# Patient Record
Sex: MALE | Race: WHITE
[De-identification: names, ages, dates, MRNs, and addresses within clinical notes are randomized per-mention and may not be internally consistent; named-entity substitution may affect disease eponyms.]

---

## 2021-02-18 ENCOUNTER — HOSPITAL ENCOUNTER (OUTPATIENT)
Dept: HOSPITAL 19 - COL.RAD | Age: 71
End: 2021-02-18
Attending: FAMILY MEDICINE
Payer: MEDICARE

## 2021-02-18 DIAGNOSIS — K82.9: Primary | ICD-10-CM

## 2021-02-18 DIAGNOSIS — K76.89: ICD-10-CM

## 2021-02-18 DIAGNOSIS — R18.8: ICD-10-CM

## 2021-02-22 ENCOUNTER — HOSPITAL ENCOUNTER (INPATIENT)
Dept: HOSPITAL 19 - COL.ER | Age: 71
LOS: 3 days | Discharge: HOME | DRG: 394 | End: 2021-02-25
Attending: INTERNAL MEDICINE | Admitting: STUDENT IN AN ORGANIZED HEALTH CARE EDUCATION/TRAINING PROGRAM
Payer: MEDICARE

## 2021-02-22 VITALS — OXYGEN SATURATION: 99 %

## 2021-02-22 VITALS — OXYGEN SATURATION: 98 %

## 2021-02-22 VITALS — OXYGEN SATURATION: 100 %

## 2021-02-22 VITALS — OXYGEN SATURATION: 97 %

## 2021-02-22 VITALS
DIASTOLIC BLOOD PRESSURE: 90 MMHG | HEART RATE: 74 BPM | TEMPERATURE: 97.8 F | OXYGEN SATURATION: 99 % | SYSTOLIC BLOOD PRESSURE: 133 MMHG

## 2021-02-22 VITALS — OXYGEN SATURATION: 96 %

## 2021-02-22 VITALS — HEIGHT: 67.99 IN | WEIGHT: 239.64 LBS | BODY MASS INDEX: 36.32 KG/M2

## 2021-02-22 DIAGNOSIS — K75.81: ICD-10-CM

## 2021-02-22 DIAGNOSIS — K21.9: ICD-10-CM

## 2021-02-22 DIAGNOSIS — E11.9: ICD-10-CM

## 2021-02-22 DIAGNOSIS — D53.9: ICD-10-CM

## 2021-02-22 DIAGNOSIS — E11.40: ICD-10-CM

## 2021-02-22 DIAGNOSIS — E87.5: ICD-10-CM

## 2021-02-22 DIAGNOSIS — D69.6: ICD-10-CM

## 2021-02-22 DIAGNOSIS — K29.30: ICD-10-CM

## 2021-02-22 DIAGNOSIS — K64.8: Primary | ICD-10-CM

## 2021-02-22 DIAGNOSIS — I10: ICD-10-CM

## 2021-02-22 DIAGNOSIS — K74.60: ICD-10-CM

## 2021-02-22 DIAGNOSIS — K72.90: ICD-10-CM

## 2021-02-22 DIAGNOSIS — R18.8: ICD-10-CM

## 2021-02-22 LAB
ALBUMIN SERPL-MCNC: 2.8 GM/DL (ref 3.5–5)
ALP SERPL-CCNC: 241 U/L (ref 50–136)
ALT SERPL-CCNC: 40 U/L (ref 4–49)
ANION GAP SERPL CALC-SCNC: 5 MMOL/L (ref 7–16)
ANION GAP SERPL CALC-SCNC: 7 MMOL/L (ref 7–16)
APPEARANCE PRT: CLEAR
AST SERPL-CCNC: 81 U/L (ref 15–37)
BASOPHILS # BLD: 0 10*3/UL (ref 0–0.2)
BASOPHILS NFR BLD AUTO: 0.5 % (ref 0–2)
BILIRUB SERPL-MCNC: 3.3 MG/DL (ref 0–1)
BUN SERPL-MCNC: 28 MG/DL (ref 9–20)
BUN SERPL-MCNC: 29 MG/DL (ref 9–20)
CALCIUM SERPL-MCNC: 8.2 MG/DL (ref 8.4–10.2)
CALCIUM SERPL-MCNC: 8.4 MG/DL (ref 8.4–10.2)
CHLORIDE SERPL-SCNC: 108 MMOL/L (ref 98–107)
CHLORIDE SERPL-SCNC: 110 MMOL/L (ref 98–107)
CO2 SERPL-SCNC: 21 MMOL/L (ref 22–30)
CO2 SERPL-SCNC: 23 MMOL/L (ref 22–30)
CREAT SERPL-SCNC: 0.83 UMOL/L (ref 0.66–1.25)
CREAT SERPL-SCNC: 0.9 UMOL/L (ref 0.66–1.25)
EOSINOPHIL # BLD: 0.2 10*3/UL (ref 0–0.7)
EOSINOPHIL NFR BLD: 4.2 % (ref 0–4)
ERYTHROCYTE [DISTWIDTH] IN BLOOD BY AUTOMATED COUNT: 16.1 % (ref 11.5–14.5)
GLUCOSE SERPL-MCNC: 126 MG/DL (ref 74–106)
GLUCOSE SERPL-MCNC: 144 MG/DL (ref 74–106)
GRANULOCYTES # BLD AUTO: 60.9 % (ref 42.2–75.2)
GRANULOCYTES # PRT: 25.6 % (ref 0–25)
HCT VFR BLD AUTO: 24 % (ref 42–52)
HCT VFR BLD AUTO: 26.5 % (ref 42–52)
HGB BLD-MCNC: 7.9 G/DL (ref 13.5–18)
HGB BLD-MCNC: 8.8 G/DL (ref 13.5–18)
INR BLD: 1.8 (ref 0.8–3)
IRON SERPL-MCNC: 120 UG/DL (ref 35–150)
LIPASE SERPL-CCNC: 194 U/L (ref 23–300)
LYMPHOCYTES # BLD: 1.3 10*3/UL (ref 1.2–3.4)
LYMPHOCYTES NFR BLD: 24.3 % (ref 20–51)
MCH RBC QN AUTO: 36 PG (ref 27–31)
MCHC RBC AUTO-ENTMCNC: 33 G/DL (ref 33–37)
MCV RBC AUTO: 107 FL (ref 80–100)
MONOCYTES # BLD: 0.5 10*3/UL (ref 0.1–0.6)
MONOCYTES NFR BLD AUTO: 9.7 % (ref 1.7–9.3)
MONONUC CELLS # PRT: 74.4 % (ref 0–75)
NEUTROPHILS # BLD: 3.3 10*3/UL (ref 1.4–6.5)
PERITONEAL FLUID RBC: 0 /MM3 (ref 0–0)
PH UR STRIP.AUTO: 5 [PH] (ref 5–8)
PLATELET # BLD AUTO: 127 K/MM3 (ref 130–400)
PMV BLD AUTO: 11.4 FL (ref 7.4–10.4)
POTASSIUM SERPL-SCNC: 5.4 MMOL/L (ref 3.4–5)
POTASSIUM SERPL-SCNC: 5.6 MMOL/L (ref 3.4–5)
PROT SERPL-MCNC: 7.2 GM/DL (ref 6.4–8.2)
PROTHROMBIN TIME: 20.1 SECONDS (ref 9.7–12.8)
RBC # BLD AUTO: 2.47 M/MM3 (ref 4.2–5.6)
RBC # UR: (no result) /HPF
SODIUM SERPL-SCNC: 136 MMOL/L (ref 137–145)
SODIUM SERPL-SCNC: 137 MMOL/L (ref 137–145)
SP GR UR STRIP.AUTO: 1.02 (ref 1–1.03)
TIBC SERPL-MCNC: 178 UG/DL (ref 261–462)
URN COLLECT METHOD CLASS: (no result)
UROBILINOGEN UR STRIP.AUTO-MCNC: 2 MG/DL
WBC # PRT AUTO: 253 /MM3 (ref 0–1000)

## 2021-02-22 PROCEDURE — 02HV33Z INSERTION OF INFUSION DEVICE INTO SUPERIOR VENA CAVA, PERCUTANEOUS APPROACH: ICD-10-PCS | Performed by: INTERNAL MEDICINE

## 2021-02-22 PROCEDURE — C1892 INTRO/SHEATH,FIXED,PEEL-AWAY: HCPCS

## 2021-02-22 PROCEDURE — P9016 RBC LEUKOCYTES REDUCED: HCPCS

## 2021-02-22 PROCEDURE — C9113 INJ PANTOPRAZOLE SODIUM, VIA: HCPCS

## 2021-02-22 PROCEDURE — C1751 CATH, INF, PER/CENT/MIDLINE: HCPCS

## 2021-02-22 NOTE — NUR
Pt is noted to have a mild, intermittent stridorous sound with exhalation.
Resp sats WNL, no signs of distress, lungs CTA. RT, Ana, was walking by,
requested second opinion. If persists or worsens will contact provider.

## 2021-02-22 NOTE — NUR
Pt noted during assessment a scant amount of bleeding noted to right upper
teeth. Gums assessed with abrasion noted. Once pt cleared mouth, no further
bleeding noted at this time.

## 2021-02-22 NOTE — NUR
Pts spouse called for an update on pt. Answered questions which included: pt
has received evening dose of Lactulose although has not had a bowel movement.
Spouse reports pt had not had one since last night. Vitals are stable. Pt has
no complaints. Is alert and oriented, answering questions to what this nurse
feels is appropriate. Discussion about [place], moving here, kids, town,
, year. Pt had jello and has been drinking water. Is on clear liquid diet.
Pt reports understanding of procedure scheduled for tomorrow, even making hand
jestures consistent with what procedure will be performed. Denies any
questions about procedure at this time. Spouse reports that the only
discussion that was had with her was in the ED. Has not spoke with a physician
since pt was admitted into this unit. Pt is currently resting with eyes closed
in bed. Pt room number provided to spouse. 4 digit passcode was verified prior
to the previous conversation. Encouraged to call with any further questions or
request for updates. Will call with any change in pt condition through out
this shift.

## 2021-02-23 VITALS — OXYGEN SATURATION: 97 %

## 2021-02-23 VITALS — OXYGEN SATURATION: 100 %

## 2021-02-23 VITALS — OXYGEN SATURATION: 99 %

## 2021-02-23 VITALS — OXYGEN SATURATION: 98 %

## 2021-02-23 VITALS
HEART RATE: 70 BPM | SYSTOLIC BLOOD PRESSURE: 115 MMHG | TEMPERATURE: 97.7 F | OXYGEN SATURATION: 99 % | DIASTOLIC BLOOD PRESSURE: 73 MMHG

## 2021-02-23 VITALS — OXYGEN SATURATION: 96 %

## 2021-02-23 VITALS
HEART RATE: 68 BPM | DIASTOLIC BLOOD PRESSURE: 83 MMHG | SYSTOLIC BLOOD PRESSURE: 128 MMHG | TEMPERATURE: 98.7 F | OXYGEN SATURATION: 99 %

## 2021-02-23 VITALS
DIASTOLIC BLOOD PRESSURE: 51 MMHG | TEMPERATURE: 97.8 F | SYSTOLIC BLOOD PRESSURE: 110 MMHG | HEART RATE: 79 BPM | OXYGEN SATURATION: 99 %

## 2021-02-23 VITALS — SYSTOLIC BLOOD PRESSURE: 140 MMHG | TEMPERATURE: 98.4 F | DIASTOLIC BLOOD PRESSURE: 83 MMHG | HEART RATE: 73 BPM

## 2021-02-23 VITALS — OXYGEN SATURATION: 95 %

## 2021-02-23 VITALS — DIASTOLIC BLOOD PRESSURE: 53 MMHG | HEART RATE: 78 BPM | SYSTOLIC BLOOD PRESSURE: 94 MMHG | OXYGEN SATURATION: 100 %

## 2021-02-23 VITALS — SYSTOLIC BLOOD PRESSURE: 135 MMHG | DIASTOLIC BLOOD PRESSURE: 88 MMHG | HEART RATE: 76 BPM | TEMPERATURE: 97.3 F

## 2021-02-23 LAB
ALBUMIN SERPL-MCNC: 2.5 GM/DL (ref 3.5–5)
ALP SERPL-CCNC: 162 U/L (ref 50–136)
ALT SERPL-CCNC: 37 U/L (ref 4–49)
ANION GAP SERPL CALC-SCNC: 5 MMOL/L (ref 7–16)
AST SERPL-CCNC: 75 U/L (ref 15–37)
BILIRUB SERPL-MCNC: 3.4 MG/DL (ref 0–1)
BUN SERPL-MCNC: 27 MG/DL (ref 9–20)
CALCIUM SERPL-MCNC: 8 MG/DL (ref 8.4–10.2)
CHLORIDE SERPL-SCNC: 108 MMOL/L (ref 98–107)
CO2 SERPL-SCNC: 23 MMOL/L (ref 22–30)
CREAT SERPL-SCNC: 0.83 UMOL/L (ref 0.66–1.25)
ERYTHROCYTE [DISTWIDTH] IN BLOOD BY AUTOMATED COUNT: 16 % (ref 11.5–14.5)
FOLATE (FOLIC ACID): 14.8 NG/ML (ref 7–31.4)
GLUCOSE SERPL-MCNC: 99 MG/DL (ref 74–106)
HCT VFR BLD AUTO: 22.9 % (ref 42–52)
HGB BLD-MCNC: 7.6 G/DL (ref 13.5–18)
MCH RBC QN AUTO: 35 PG (ref 27–31)
MCHC RBC AUTO-ENTMCNC: 33 G/DL (ref 33–37)
MCV RBC AUTO: 106 FL (ref 80–100)
PLATELET # BLD AUTO: 114 K/MM3 (ref 130–400)
PMV BLD AUTO: 10.6 FL (ref 7.4–10.4)
POTASSIUM SERPL-SCNC: 5.2 MMOL/L (ref 3.4–5)
PROT SERPL-MCNC: 6.8 GM/DL (ref 6.4–8.2)
RBC # BLD AUTO: 2.16 M/MM3 (ref 4.2–5.6)
SODIUM SERPL-SCNC: 136 MMOL/L (ref 137–145)

## 2021-02-23 PROCEDURE — 0W9G3ZZ DRAINAGE OF PERITONEAL CAVITY, PERCUTANEOUS APPROACH: ICD-10-PCS | Performed by: INTERNAL MEDICINE

## 2021-02-23 PROCEDURE — 0DB68ZX EXCISION OF STOMACH, VIA NATURAL OR ARTIFICIAL OPENING ENDOSCOPIC, DIAGNOSTIC: ICD-10-PCS | Performed by: INTERNAL MEDICINE

## 2021-02-23 NOTE — NUR
Pt has been pleasant and cooperative with each encounter through out shift. Is
currently resting in bed. Requested some wet mouth swabs for dry mouth. Denies
any complaints. Is using the urinal at bedside as needed. No further bleeding
noted to gums. A&O to self, time, situation and current events.

## 2021-02-23 NOTE — NUR
PT here at 1420 from [facility] unit. PT is ambulatory with a steady gait. PT
moved to bed and has zero complaints. Vitals WNL limits. No bleeding, oozing,
or bruising of catheter insertion site.

## 2021-02-24 NOTE — NUR
Follow-up visit; Patient appeared to feel better today and stated he was going
to start going to [employer] and pray.  offered God's blessings and wished
him well.

## 2021-02-25 VITALS — HEART RATE: 70 BPM | DIASTOLIC BLOOD PRESSURE: 79 MMHG | SYSTOLIC BLOOD PRESSURE: 107 MMHG | TEMPERATURE: 97.8 F

## 2021-02-25 VITALS — SYSTOLIC BLOOD PRESSURE: 105 MMHG | HEART RATE: 72 BPM | DIASTOLIC BLOOD PRESSURE: 68 MMHG | TEMPERATURE: 97.7 F

## 2021-02-25 VITALS — HEART RATE: 78 BPM | TEMPERATURE: 97.8 F | SYSTOLIC BLOOD PRESSURE: 112 MMHG | DIASTOLIC BLOOD PRESSURE: 56 MMHG

## 2021-02-25 LAB
ALBUMIN SERPL-MCNC: 2.2 GM/DL (ref 3.5–5)
ALP SERPL-CCNC: 157 U/L (ref 50–136)
ALT SERPL-CCNC: 33 U/L (ref 4–49)
ANION GAP SERPL CALC-SCNC: 3 MMOL/L (ref 7–16)
ANION GAP SERPL CALC-SCNC: 3 MMOL/L (ref 7–16)
AST SERPL-CCNC: 71 U/L (ref 15–37)
BASOPHILS # BLD: 0 10*3/UL (ref 0–0.2)
BASOPHILS NFR BLD AUTO: 0.5 % (ref 0–2)
BILIRUB SERPL-MCNC: 2.5 MG/DL (ref 0–1)
BUN SERPL-MCNC: 25 MG/DL (ref 9–20)
BUN SERPL-MCNC: 27 MG/DL (ref 9–20)
CALCIUM SERPL-MCNC: 8 MG/DL (ref 8.4–10.2)
CALCIUM SERPL-MCNC: 8.1 MG/DL (ref 8.4–10.2)
CHLORIDE SERPL-SCNC: 105 MMOL/L (ref 98–107)
CHLORIDE SERPL-SCNC: 106 MMOL/L (ref 98–107)
CO2 SERPL-SCNC: 25 MMOL/L (ref 22–30)
CO2 SERPL-SCNC: 27 MMOL/L (ref 22–30)
CREAT SERPL-SCNC: 0.97 UMOL/L (ref 0.66–1.25)
CREAT SERPL-SCNC: 1.06 UMOL/L (ref 0.66–1.25)
EOSINOPHIL # BLD: 0.2 10*3/UL (ref 0–0.7)
EOSINOPHIL NFR BLD: 3.9 % (ref 0–4)
ERYTHROCYTE [DISTWIDTH] IN BLOOD BY AUTOMATED COUNT: 15.8 % (ref 11.5–14.5)
ERYTHROCYTE [DISTWIDTH] IN BLOOD BY AUTOMATED COUNT: 16.5 % (ref 11.5–14.5)
GLUCOSE SERPL-MCNC: 114 MG/DL (ref 74–106)
GLUCOSE SERPL-MCNC: 98 MG/DL (ref 74–106)
GRANULOCYTES # BLD AUTO: 51.5 % (ref 42.2–75.2)
HCT VFR BLD AUTO: 20.3 % (ref 42–52)
HCT VFR BLD AUTO: 22 % (ref 42–52)
HCT VFR BLD AUTO: 23.8 % (ref 42–52)
HGB BLD-MCNC: 7 G/DL (ref 13.5–18)
HGB BLD-MCNC: 7.4 G/DL (ref 13.5–18)
HGB BLD-MCNC: 7.8 G/DL (ref 13.5–18)
LYMPHOCYTES # BLD: 1.3 10*3/UL (ref 1.2–3.4)
LYMPHOCYTES NFR BLD: 30.5 % (ref 20–51)
MCH RBC QN AUTO: 35 PG (ref 27–31)
MCH RBC QN AUTO: 36 PG (ref 27–31)
MCHC RBC AUTO-ENTMCNC: 34 G/DL (ref 33–37)
MCHC RBC AUTO-ENTMCNC: 35 G/DL (ref 33–37)
MCV RBC AUTO: 103 FL (ref 80–100)
MCV RBC AUTO: 104 FL (ref 80–100)
MONOCYTES # BLD: 0.6 10*3/UL (ref 0.1–0.6)
MONOCYTES NFR BLD AUTO: 13.4 % (ref 1.7–9.3)
NEUTROPHILS # BLD: 2.1 10*3/UL (ref 1.4–6.5)
PLATELET # BLD AUTO: 75 K/MM3 (ref 130–400)
PLATELET # BLD AUTO: 88 K/MM3 (ref 130–400)
PMV BLD AUTO: 11.2 FL (ref 7.4–10.4)
PMV BLD AUTO: 11.3 FL (ref 7.4–10.4)
POTASSIUM SERPL-SCNC: 4.5 MMOL/L (ref 3.4–5)
POTASSIUM SERPL-SCNC: 4.9 MMOL/L (ref 3.4–5)
PROT SERPL-MCNC: 6.1 GM/DL (ref 6.4–8.2)
RBC # BLD AUTO: 1.96 M/MM3 (ref 4.2–5.6)
RBC # BLD AUTO: 2.14 M/MM3 (ref 4.2–5.6)
SODIUM SERPL-SCNC: 134 MMOL/L (ref 137–145)
SODIUM SERPL-SCNC: 135 MMOL/L (ref 137–145)

## 2021-02-25 NOTE — NUR
Discharge instructions given to the patient and his wife, instructed to take
meds as prescribed, follow up as scheduled with PCP and GI, meds as
prescribed, scripts sent to pharmacy for him, PICC line removed and post
instructions given, leaving with his wife and CNA escorting them out

## 2021-02-25 NOTE — NUR
Follow-up visit; Patient and his wife thanked  for coming in and
meeting his wife and offering God's blessings to them.

## 2021-02-25 NOTE — NUR
met with patient to discuss discharge planning. Patient lives in
[place] with his wife, Sherlyn (ph#[phone]) and his son, Andrew. Patient
states he does not have a primary care physician but is working on it. SW
offered to make him an appointment but patient declined. Patient cannot
remember what pharmacy he uses but states his wife would know. Patient does
not use any DME and reports independence with ADLS. Patient plans to return
home upon discharge. SW will continue to follow.

## 2021-02-25 NOTE — NUR
Primary nurse was assisted with [phone] patient care by [facility] [employer] [facility]
Skylar Burnett and [employer] instructor Evangelina Germain RN-BC.

## 2021-02-25 NOTE — NUR
attended clinical rounds with the team. The patient's wife
was in the room. The patient is independent in the room. PT signed off. The
patient's PCP is Dr. Le. Plan is home.

## 2021-03-28 ENCOUNTER — HOSPITAL ENCOUNTER (INPATIENT)
Dept: HOSPITAL 19 - COL.ER | Age: 71
LOS: 3 days | Discharge: HOME | DRG: 441 | End: 2021-03-31
Attending: HOSPITALIST | Admitting: HOSPITALIST
Payer: MEDICARE

## 2021-03-28 VITALS — HEIGHT: 70 IN | WEIGHT: 241.85 LBS | BODY MASS INDEX: 34.62 KG/M2

## 2021-03-28 DIAGNOSIS — K27.9: ICD-10-CM

## 2021-03-28 DIAGNOSIS — K31.89: ICD-10-CM

## 2021-03-28 DIAGNOSIS — K75.81: ICD-10-CM

## 2021-03-28 DIAGNOSIS — K31.811: ICD-10-CM

## 2021-03-28 DIAGNOSIS — K72.90: ICD-10-CM

## 2021-03-28 DIAGNOSIS — D53.9: ICD-10-CM

## 2021-03-28 DIAGNOSIS — E11.9: ICD-10-CM

## 2021-03-28 DIAGNOSIS — K74.60: ICD-10-CM

## 2021-03-28 DIAGNOSIS — E87.2: ICD-10-CM

## 2021-03-28 DIAGNOSIS — K76.6: Primary | ICD-10-CM

## 2021-03-28 DIAGNOSIS — I10: ICD-10-CM

## 2021-03-28 DIAGNOSIS — K29.70: ICD-10-CM

## 2021-03-28 DIAGNOSIS — K44.9: ICD-10-CM

## 2021-03-28 DIAGNOSIS — D68.4: ICD-10-CM

## 2021-03-28 DIAGNOSIS — D69.6: ICD-10-CM

## 2021-03-28 DIAGNOSIS — D62: ICD-10-CM

## 2021-03-28 DIAGNOSIS — R18.8: ICD-10-CM

## 2021-03-28 DIAGNOSIS — Z87.891: ICD-10-CM

## 2021-03-28 DIAGNOSIS — N17.9: ICD-10-CM

## 2021-03-28 DIAGNOSIS — Z20.828: ICD-10-CM

## 2021-03-28 DIAGNOSIS — K21.9: ICD-10-CM

## 2021-03-28 DIAGNOSIS — Z88.7: ICD-10-CM

## 2021-03-28 LAB
ALBUMIN SERPL-MCNC: 2.3 GM/DL (ref 3.5–5)
ALP SERPL-CCNC: 189 U/L (ref 50–136)
ALT SERPL-CCNC: 34 U/L (ref 4–49)
ANION GAP SERPL CALC-SCNC: 9 MMOL/L (ref 7–16)
APTT PPP: 39.2 SECONDS (ref 26–37)
AST SERPL-CCNC: 91 U/L (ref 15–37)
BASOPHILS # BLD: 0 10*3/UL (ref 0–0.2)
BASOPHILS NFR BLD AUTO: 0.2 % (ref 0–2)
BILIRUB SERPL-MCNC: 2.9 MG/DL (ref 0–1)
BUN SERPL-MCNC: 28 MG/DL (ref 9–20)
CALCIUM SERPL-MCNC: 7.8 MG/DL (ref 8.4–10.2)
CHLORIDE SERPL-SCNC: 106 MMOL/L (ref 98–107)
CO2 SERPL-SCNC: 19 MMOL/L (ref 22–30)
CREAT SERPL-SCNC: 1.53 UMOL/L (ref 0.66–1.25)
EOSINOPHIL # BLD: 0.1 10*3/UL (ref 0–0.7)
EOSINOPHIL NFR BLD: 1.6 % (ref 0–4)
ERYTHROCYTE [DISTWIDTH] IN BLOOD BY AUTOMATED COUNT: 16.3 % (ref 11.5–14.5)
GLUCOSE SERPL-MCNC: 147 MG/DL (ref 74–106)
GRANULOCYTES # BLD AUTO: 83.1 % (ref 42.2–75.2)
HCT VFR BLD AUTO: 22.2 % (ref 42–52)
HGB BLD-MCNC: 7.2 G/DL (ref 13.5–18)
INR BLD: 2.1 (ref 0.8–3)
LACTATE SERPL-SCNC: 4.3 MMOL/L (ref 0.4–2)
LIPASE SERPL-CCNC: 206 U/L (ref 23–300)
LYMPHOCYTES # BLD: 0.6 10*3/UL (ref 1.2–3.4)
LYMPHOCYTES NFR BLD: 7.1 % (ref 20–51)
MCH RBC QN AUTO: 35 PG (ref 27–31)
MCHC RBC AUTO-ENTMCNC: 32 G/DL (ref 33–37)
MCV RBC AUTO: 107 FL (ref 80–100)
MONOCYTES # BLD: 0.6 10*3/UL (ref 0.1–0.6)
MONOCYTES NFR BLD AUTO: 7.5 % (ref 1.7–9.3)
NEUTROPHILS # BLD: 6.7 10*3/UL (ref 1.4–6.5)
PLATELET # BLD AUTO: 83 K/MM3 (ref 130–400)
PMV BLD AUTO: 11.8 FL (ref 7.4–10.4)
POTASSIUM SERPL-SCNC: 4.6 MMOL/L (ref 3.4–5)
PROT SERPL-MCNC: 6.7 GM/DL (ref 6.4–8.2)
PROTHROMBIN TIME: 23.5 SECONDS (ref 9.7–12.8)
RBC # BLD AUTO: 2.08 M/MM3 (ref 4.2–5.6)
SODIUM SERPL-SCNC: 134 MMOL/L (ref 137–145)

## 2021-03-28 PROCEDURE — P9016 RBC LEUKOCYTES REDUCED: HCPCS

## 2021-03-28 PROCEDURE — C9113 INJ PANTOPRAZOLE SODIUM, VIA: HCPCS

## 2021-03-29 VITALS — OXYGEN SATURATION: 98 %

## 2021-03-29 VITALS — OXYGEN SATURATION: 95 %

## 2021-03-29 VITALS — OXYGEN SATURATION: 96 %

## 2021-03-29 VITALS
DIASTOLIC BLOOD PRESSURE: 85 MMHG | TEMPERATURE: 98.1 F | OXYGEN SATURATION: 96 % | HEART RATE: 79 BPM | SYSTOLIC BLOOD PRESSURE: 125 MMHG

## 2021-03-29 VITALS — OXYGEN SATURATION: 97 %

## 2021-03-29 VITALS — SYSTOLIC BLOOD PRESSURE: 116 MMHG | DIASTOLIC BLOOD PRESSURE: 101 MMHG | HEART RATE: 80 BPM | TEMPERATURE: 98.1 F

## 2021-03-29 VITALS — HEART RATE: 68 BPM | DIASTOLIC BLOOD PRESSURE: 74 MMHG | SYSTOLIC BLOOD PRESSURE: 105 MMHG | TEMPERATURE: 98.2 F

## 2021-03-29 VITALS
DIASTOLIC BLOOD PRESSURE: 80 MMHG | TEMPERATURE: 98 F | HEART RATE: 74 BPM | OXYGEN SATURATION: 97 % | SYSTOLIC BLOOD PRESSURE: 104 MMHG

## 2021-03-29 VITALS — TEMPERATURE: 98.1 F | HEART RATE: 77 BPM | SYSTOLIC BLOOD PRESSURE: 109 MMHG | DIASTOLIC BLOOD PRESSURE: 75 MMHG

## 2021-03-29 VITALS — OXYGEN SATURATION: 99 %

## 2021-03-29 VITALS — TEMPERATURE: 98.1 F | DIASTOLIC BLOOD PRESSURE: 75 MMHG | HEART RATE: 67 BPM | SYSTOLIC BLOOD PRESSURE: 118 MMHG

## 2021-03-29 VITALS
TEMPERATURE: 98.1 F | SYSTOLIC BLOOD PRESSURE: 111 MMHG | HEART RATE: 80 BPM | OXYGEN SATURATION: 98 % | DIASTOLIC BLOOD PRESSURE: 74 MMHG

## 2021-03-29 VITALS — DIASTOLIC BLOOD PRESSURE: 89 MMHG | HEART RATE: 80 BPM | TEMPERATURE: 98.1 F | SYSTOLIC BLOOD PRESSURE: 102 MMHG

## 2021-03-29 VITALS — OXYGEN SATURATION: 94 %

## 2021-03-29 VITALS
DIASTOLIC BLOOD PRESSURE: 80 MMHG | SYSTOLIC BLOOD PRESSURE: 110 MMHG | HEART RATE: 80 BPM | OXYGEN SATURATION: 98 % | TEMPERATURE: 98.1 F

## 2021-03-29 VITALS — HEART RATE: 79 BPM | TEMPERATURE: 98.1 F | SYSTOLIC BLOOD PRESSURE: 110 MMHG | DIASTOLIC BLOOD PRESSURE: 74 MMHG

## 2021-03-29 VITALS — HEART RATE: 73 BPM | DIASTOLIC BLOOD PRESSURE: 84 MMHG | TEMPERATURE: 98.1 F | SYSTOLIC BLOOD PRESSURE: 120 MMHG

## 2021-03-29 VITALS
SYSTOLIC BLOOD PRESSURE: 104 MMHG | OXYGEN SATURATION: 98 % | DIASTOLIC BLOOD PRESSURE: 89 MMHG | HEART RATE: 79 BPM | TEMPERATURE: 98.1 F

## 2021-03-29 VITALS — HEART RATE: 69 BPM | SYSTOLIC BLOOD PRESSURE: 113 MMHG | TEMPERATURE: 98.1 F | DIASTOLIC BLOOD PRESSURE: 61 MMHG

## 2021-03-29 VITALS — SYSTOLIC BLOOD PRESSURE: 125 MMHG | HEART RATE: 79 BPM | DIASTOLIC BLOOD PRESSURE: 85 MMHG | TEMPERATURE: 98.1 F

## 2021-03-29 VITALS — HEART RATE: 77 BPM | DIASTOLIC BLOOD PRESSURE: 80 MMHG | TEMPERATURE: 98 F | SYSTOLIC BLOOD PRESSURE: 106 MMHG

## 2021-03-29 VITALS — DIASTOLIC BLOOD PRESSURE: 72 MMHG | TEMPERATURE: 98.1 F | SYSTOLIC BLOOD PRESSURE: 115 MMHG | HEART RATE: 73 BPM

## 2021-03-29 VITALS — HEART RATE: 76 BPM | TEMPERATURE: 98.4 F | DIASTOLIC BLOOD PRESSURE: 90 MMHG | SYSTOLIC BLOOD PRESSURE: 120 MMHG

## 2021-03-29 VITALS — OXYGEN SATURATION: 88 %

## 2021-03-29 LAB
ALBUMIN SERPL-MCNC: 2.2 GM/DL (ref 3.5–5)
ALP SERPL-CCNC: 161 U/L (ref 50–136)
ALT SERPL-CCNC: 32 U/L (ref 4–49)
ANION GAP SERPL CALC-SCNC: 8 MMOL/L (ref 7–16)
APPEARANCE PRT: (no result)
AST SERPL-CCNC: 87 U/L (ref 15–37)
BASOPHILS # BLD: 0 10*3/UL (ref 0–0.2)
BASOPHILS # BLD: 0 10*3/UL (ref 0–0.2)
BASOPHILS NFR BLD AUTO: 0.2 % (ref 0–2)
BASOPHILS NFR BLD AUTO: 0.2 % (ref 0–2)
BILIRUB SERPL-MCNC: 3.3 MG/DL (ref 0–1)
BUN SERPL-MCNC: 29 MG/DL (ref 9–20)
CALCIUM SERPL-MCNC: 7.5 MG/DL (ref 8.4–10.2)
CHLORIDE SERPL-SCNC: 108 MMOL/L (ref 98–107)
CO2 SERPL-SCNC: 19 MMOL/L (ref 22–30)
CREAT SERPL-SCNC: 1.56 UMOL/L (ref 0.66–1.25)
EOSINOPHIL # BLD: 0 10*3/UL (ref 0–0.7)
EOSINOPHIL # BLD: 0.1 10*3/UL (ref 0–0.7)
EOSINOPHIL NFR BLD: 0.4 % (ref 0–4)
EOSINOPHIL NFR BLD: 0.8 % (ref 0–4)
ERYTHROCYTE [DISTWIDTH] IN BLOOD BY AUTOMATED COUNT: 16.1 % (ref 11.5–14.5)
ERYTHROCYTE [DISTWIDTH] IN BLOOD BY AUTOMATED COUNT: 16.3 % (ref 11.5–14.5)
FOLATE (FOLIC ACID): 17.8 NG/ML (ref 7–31.4)
GLUCOSE SERPL-MCNC: 164 MG/DL (ref 74–106)
GRANULOCYTES # BLD AUTO: 83.2 % (ref 42.2–75.2)
GRANULOCYTES # BLD AUTO: 85.2 % (ref 42.2–75.2)
GRANULOCYTES # PRT: 26.8 % (ref 0–25)
HCT VFR BLD AUTO: 20.8 % (ref 42–52)
HCT VFR BLD AUTO: 21.3 % (ref 42–52)
HCT VFR BLD AUTO: 24.1 % (ref 42–52)
HGB BLD-MCNC: 6.7 G/DL (ref 13.5–18)
HGB BLD-MCNC: 6.9 G/DL (ref 13.5–18)
HGB BLD-MCNC: 7.8 G/DL (ref 13.5–18)
INR BLD: 2.2 (ref 0.8–3)
IRON SERPL-MCNC: 72 UG/DL (ref 35–150)
LYMPHOCYTES # BLD: 0.4 10*3/UL (ref 1.2–3.4)
LYMPHOCYTES # BLD: 0.4 10*3/UL (ref 1.2–3.4)
LYMPHOCYTES NFR BLD: 6.1 % (ref 20–51)
LYMPHOCYTES NFR BLD: 6.5 % (ref 20–51)
MCH RBC QN AUTO: 34 PG (ref 27–31)
MCH RBC QN AUTO: 35 PG (ref 27–31)
MCHC RBC AUTO-ENTMCNC: 32 G/DL (ref 33–37)
MCHC RBC AUTO-ENTMCNC: 32 G/DL (ref 33–37)
MCV RBC AUTO: 105 FL (ref 80–100)
MCV RBC AUTO: 107 FL (ref 80–100)
MONOCYTES # BLD: 0.4 10*3/UL (ref 0.1–0.6)
MONOCYTES # BLD: 0.5 10*3/UL (ref 0.1–0.6)
MONOCYTES NFR BLD AUTO: 7.1 % (ref 1.7–9.3)
MONOCYTES NFR BLD AUTO: 8.9 % (ref 1.7–9.3)
MONONUC CELLS # PRT: 73.2 % (ref 0–75)
NEUTROPHILS # BLD: 4.6 10*3/UL (ref 1.4–6.5)
NEUTROPHILS # BLD: 4.9 10*3/UL (ref 1.4–6.5)
PERITONEAL FLUID RBC: 4000 /MM3 (ref 0–0)
PH UR STRIP.AUTO: 5 [PH] (ref 5–8)
PLATELET # BLD AUTO: 59 K/MM3 (ref 130–400)
PLATELET # BLD AUTO: 63 K/MM3 (ref 130–400)
PMV BLD AUTO: 11.5 FL (ref 7.4–10.4)
PMV BLD AUTO: 11.5 FL (ref 7.4–10.4)
POTASSIUM SERPL-SCNC: 5 MMOL/L (ref 3.4–5)
PROT SERPL-MCNC: 6.5 GM/DL (ref 6.4–8.2)
PROTHROMBIN TIME: 25.1 SECONDS (ref 9.7–12.8)
RBC # BLD AUTO: 1.94 M/MM3 (ref 4.2–5.6)
RBC # BLD AUTO: 2.02 M/MM3 (ref 4.2–5.6)
RBC # UR: (no result) /HPF
SODIUM SERPL-SCNC: 135 MMOL/L (ref 137–145)
SP GR UR STRIP.AUTO: 1.02 (ref 1–1.03)
TIBC SERPL-MCNC: 181 UG/DL (ref 261–462)
TROPONIN I SERPL-MCNC: < 0.012 NG/ML (ref 0–0.04)
URN COLLECT METHOD CLASS: (no result)
WBC # PRT AUTO: 295 /MM3 (ref 0–1000)

## 2021-03-29 PROCEDURE — 0DB68ZX EXCISION OF STOMACH, VIA NATURAL OR ARTIFICIAL OPENING ENDOSCOPIC, DIAGNOSTIC: ICD-10-PCS | Performed by: INTERNAL MEDICINE

## 2021-03-29 NOTE — NUR
attended clinical rounds with the team then followed up with
patient to discuss discharge planning. Patient's wife, Mila (ph#[phone])
is at bedside. Patient and Mila live in [place] with their son, Andrew
(ph#[phone]) and report they moved here from [place] a few months ago.
Patient's primary care physician is Dr. Le and he obtains medications from
[facility] [address] with no difficulties. Patient does not use any DME and
reports independence with ADLS. JOANN asked about DPOA-HC and patient and his
wife report they had it completed in [place] but have not completed a form
here in [place]. Both patient and his wife, Mila would like to complete a new
DPOA-HC form while here in the hospital. Patient would like to designate his
wife, Mila and his son, Andrew. Patient had some difficulty initially
verbalizing understanding as English is his second language. Patient did
eventually verbalize understanding of what he would be signing and again
stated he wanted to designate his wife, Mila. JOANN and Nicole SAAVEDRA provided
witness signature. JOANN provided original and copies to patient, then placed a
copy in patient's chart. Patient plans to return home upon discharge.

## 2021-03-29 NOTE — NUR
The physician from [facility] ordered 1 unit of PRBC for a Hemoglobin of 6.9. I
spoke to Lab and they said the order isn't done corrently. I will speak to
Es MILLS to put in an order. Blood consent is signed.

## 2021-03-29 NOTE — NUR
RECEIVED REPORT FROM QUENTIN SIDDIQUI. PT SITTING UP IN BED TALKING TO DR MARCUS.
PT ON RA. VSS. CALLL LIGHT WITHIN REACH.

## 2021-03-29 NOTE — NUR
PT TO EGD AT 1043. REPORT RECEIVED FROM QUENTIN MENDEZ AT 1115, PT BACK IN ROOM AND
PLACED ON BEDSIDE CONTINUOUS MONITOR AT 1125. VSS. CALL LIGHT WITHIN REACH. PT
FULLY AWAKE AT THIS TIME.

## 2021-03-29 NOTE — NUR
Vancomycin Initial Dosing Pharmacy Note
Ordering provider: Jose David Kaye MD
71 yo M
Indication: Fever of Unknown origin
Goal: 15-20
Hx: None identified
PMH significant for cirrhosis of the liver w/ MELD score of 25.
 
BMI: 32.4
Wt: 102.3 KG
adjBW: 84.7 kg
SCr: 1.53
adjBW estCrCl ~ 54 ml/min
t 1/2 ~ 14h
 
Tmax: 100.3
WBC WNL
LA 4.3 -> 3.3
Micro ordered
peritoneal fluid analysis: yellow, hazy, 295 WBC,
4000 RBC, Mononuclear WBC 73.2%, polynuclear WBC 26.8%
 
Pt received 1.5 gm (~15 MG/KG) X1 LD IN ED.
Will start a maintenance regimen of 1250 mg q18h. Pt is at risk for not
following population based kinetics and accumulation 2/2 elevated BMI. Will
follow renal function, micro and plan of care for need to adjust therapy.
Thank you for this dosing consult!

## 2021-03-29 NOTE — NUR
Patient arrived from the emergency department via stretcher around 310AM. He
denies pain, SOB, dizziness. His vitals are stable. Blood Pressure is 118/60.
Oxygen saturation is 100% on room air, HR is 69 and respirations are 16
breaths per minute. I spoke with TL about his hemoglobin level of 6.9
during his labs in the emergency department and they will order one unit of
PRBC. He currently has NS running at 100 ml/hr. Zosyn, Vancomyocin and
Protonix, all IV.

## 2021-03-29 NOTE — NUR
DR HUBBARD AT BEDSIDE FOR ASSESSMENT. DISCUSSED POC WITH FAMILY AND PT. RN SPOKE
TO PROVIDER ABOUT HGB AND INR. NEW ORDERS RECEIVED.

## 2021-03-30 VITALS — OXYGEN SATURATION: 96 %

## 2021-03-30 VITALS — OXYGEN SATURATION: 93 %

## 2021-03-30 VITALS — SYSTOLIC BLOOD PRESSURE: 107 MMHG | TEMPERATURE: 98.5 F | DIASTOLIC BLOOD PRESSURE: 98 MMHG | HEART RATE: 87 BPM

## 2021-03-30 VITALS — OXYGEN SATURATION: 92 %

## 2021-03-30 VITALS — OXYGEN SATURATION: 97 %

## 2021-03-30 VITALS — OXYGEN SATURATION: 99 %

## 2021-03-30 VITALS — OXYGEN SATURATION: 98 %

## 2021-03-30 VITALS — SYSTOLIC BLOOD PRESSURE: 146 MMHG | HEART RATE: 80 BPM | TEMPERATURE: 98.5 F | DIASTOLIC BLOOD PRESSURE: 82 MMHG

## 2021-03-30 VITALS
TEMPERATURE: 98.4 F | SYSTOLIC BLOOD PRESSURE: 126 MMHG | DIASTOLIC BLOOD PRESSURE: 80 MMHG | HEART RATE: 77 BPM | OXYGEN SATURATION: 99 %

## 2021-03-30 VITALS — OXYGEN SATURATION: 94 %

## 2021-03-30 VITALS
OXYGEN SATURATION: 94 % | DIASTOLIC BLOOD PRESSURE: 56 MMHG | HEART RATE: 77 BPM | SYSTOLIC BLOOD PRESSURE: 102 MMHG | TEMPERATURE: 98 F

## 2021-03-30 VITALS — OXYGEN SATURATION: 95 %

## 2021-03-30 VITALS — DIASTOLIC BLOOD PRESSURE: 55 MMHG | TEMPERATURE: 98 F | SYSTOLIC BLOOD PRESSURE: 106 MMHG | HEART RATE: 73 BPM

## 2021-03-30 VITALS
OXYGEN SATURATION: 98 % | HEART RATE: 77 BPM | DIASTOLIC BLOOD PRESSURE: 71 MMHG | SYSTOLIC BLOOD PRESSURE: 124 MMHG | TEMPERATURE: 98.1 F

## 2021-03-30 VITALS — DIASTOLIC BLOOD PRESSURE: 56 MMHG | TEMPERATURE: 98 F | SYSTOLIC BLOOD PRESSURE: 102 MMHG | HEART RATE: 73 BPM

## 2021-03-30 VITALS — HEART RATE: 70 BPM | TEMPERATURE: 98.1 F | SYSTOLIC BLOOD PRESSURE: 120 MMHG | DIASTOLIC BLOOD PRESSURE: 71 MMHG

## 2021-03-30 VITALS — HEART RATE: 71 BPM | TEMPERATURE: 98 F | SYSTOLIC BLOOD PRESSURE: 123 MMHG | DIASTOLIC BLOOD PRESSURE: 81 MMHG

## 2021-03-30 VITALS
OXYGEN SATURATION: 95 % | TEMPERATURE: 98 F | SYSTOLIC BLOOD PRESSURE: 130 MMHG | DIASTOLIC BLOOD PRESSURE: 56 MMHG | HEART RATE: 77 BPM

## 2021-03-30 VITALS — OXYGEN SATURATION: 91 %

## 2021-03-30 LAB
ALBUMIN SERPL-MCNC: 2.2 GM/DL (ref 3.5–5)
ALP SERPL-CCNC: 120 U/L (ref 50–136)
ALT SERPL-CCNC: 28 U/L (ref 4–49)
ANION GAP SERPL CALC-SCNC: 4 MMOL/L (ref 7–16)
AST SERPL-CCNC: 62 U/L (ref 15–37)
BASOPHILS # BLD: 0 10*3/UL (ref 0–0.2)
BASOPHILS NFR BLD AUTO: 0.8 % (ref 0–2)
BILIRUB SERPL-MCNC: 3.1 MG/DL (ref 0–1)
BUN SERPL-MCNC: 26 MG/DL (ref 9–20)
CALCIUM SERPL-MCNC: 7.3 MG/DL (ref 8.4–10.2)
CHLORIDE SERPL-SCNC: 111 MMOL/L (ref 98–107)
CO2 SERPL-SCNC: 22 MMOL/L (ref 22–30)
CREAT SERPL-SCNC: 1.23 UMOL/L (ref 0.66–1.25)
EOSINOPHIL # BLD: 0.1 10*3/UL (ref 0–0.7)
EOSINOPHIL NFR BLD: 2 % (ref 0–4)
ERYTHROCYTE [DISTWIDTH] IN BLOOD BY AUTOMATED COUNT: 17 % (ref 11.5–14.5)
GLUCOSE SERPL-MCNC: 105 MG/DL (ref 74–106)
GRANULOCYTES # BLD AUTO: 61.2 % (ref 42.2–75.2)
HCT VFR BLD AUTO: 21.2 % (ref 42–52)
HCT VFR BLD AUTO: 26.1 % (ref 42–52)
HGB BLD-MCNC: 6.8 G/DL (ref 13.5–18)
HGB BLD-MCNC: 8.7 G/DL (ref 13.5–18)
INR BLD: 2.3 (ref 0.8–3)
LYMPHOCYTES # BLD: 0.5 10*3/UL (ref 1.2–3.4)
LYMPHOCYTES NFR BLD: 20.4 % (ref 20–51)
MAGNESIUM SERPL-MCNC: 2.2 MG/DL (ref 1.6–2.3)
MCH RBC QN AUTO: 33 PG (ref 27–31)
MCHC RBC AUTO-ENTMCNC: 32 G/DL (ref 33–37)
MCV RBC AUTO: 104 FL (ref 80–100)
MONOCYTES # BLD: 0.4 10*3/UL (ref 0.1–0.6)
MONOCYTES NFR BLD AUTO: 15.2 % (ref 1.7–9.3)
NEUTROPHILS # BLD: 1.5 10*3/UL (ref 1.4–6.5)
PLATELET # BLD AUTO: 63 K/MM3 (ref 130–400)
PMV BLD AUTO: 11.5 FL (ref 7.4–10.4)
POTASSIUM SERPL-SCNC: 4.9 MMOL/L (ref 3.4–5)
PROT SERPL-MCNC: 6.5 GM/DL (ref 6.4–8.2)
PROTHROMBIN TIME: 26.3 SECONDS (ref 9.7–12.8)
RBC # BLD AUTO: 2.04 M/MM3 (ref 4.2–5.6)
SODIUM SERPL-SCNC: 137 MMOL/L (ref 137–145)

## 2021-03-30 NOTE — NUR
Patient's wife (Mila) called around 0445. I gave her an update of the patients
condition, he is stable, recieving antibiotics and fluids and he has no
complaints. She was very pleased. She only had one concern. She said the
patient has a daughter from [place] that is "Sneaky" and she wanted to
limit the communication with that daughter to only vague updates. I told her,
the daughter has not called while I was on shift. She said "I will inform the
rest of the family about the details when the time is right." I told her we
respect her wishes and I'll relay the message to dayshift.

## 2021-03-30 NOTE — NUR
Patient had an uneventful night. His blood pressures has been a little soft,
its bests to take it when he is laying on his back with his arms straight. But
it's currently 99/54 with a MAP of 84. He is sleeping but very easily
arousable. Asymptomatic and denies pain. He has had four bowel movements over
night, with is normal because he gets lactulose. His gums are not bleeding. He
has no complaints.

## 2021-03-30 NOTE — NUR
Patient Rx Refill Request For   -Amlodipine (NORVASC) 10MG TAB  -Lisinpril-Hydrochlorothiazide 20-12.5MG Per TAB      Last Refill: 1/13/20    Last OV: 8/12/19    [facility]: [facility]; [place]     SPOKE TO DR MARCUS ABOUT IF SHE WAS COMING BY TODAY. PHYSICIAN STATES YES
AFTER CLINIC. INFORMED PHYSICIAN ABOUT PT HAVING MULTIPLE LIQUID BLACK STOOLS.

## 2021-03-31 VITALS — HEART RATE: 62 BPM | SYSTOLIC BLOOD PRESSURE: 125 MMHG | DIASTOLIC BLOOD PRESSURE: 80 MMHG | TEMPERATURE: 98.1 F

## 2021-03-31 VITALS — DIASTOLIC BLOOD PRESSURE: 66 MMHG | HEART RATE: 68 BPM | SYSTOLIC BLOOD PRESSURE: 112 MMHG | TEMPERATURE: 98.2 F

## 2021-03-31 VITALS — SYSTOLIC BLOOD PRESSURE: 125 MMHG | HEART RATE: 73 BPM | TEMPERATURE: 98.5 F | DIASTOLIC BLOOD PRESSURE: 58 MMHG

## 2021-03-31 LAB
ALBUMIN SERPL-MCNC: 2.1 GM/DL (ref 3.5–5)
ALP SERPL-CCNC: 120 U/L (ref 50–136)
ALT SERPL-CCNC: 26 U/L (ref 4–49)
ANION GAP SERPL CALC-SCNC: 4 MMOL/L (ref 7–16)
AST SERPL-CCNC: 61 U/L (ref 15–37)
BASOPHILS # BLD: 0 10*3/UL (ref 0–0.2)
BASOPHILS NFR BLD AUTO: 0.7 % (ref 0–2)
BILIRUB SERPL-MCNC: 2.6 MG/DL (ref 0–1)
BUN SERPL-MCNC: 17 MG/DL (ref 9–20)
CALCIUM SERPL-MCNC: 7.4 MG/DL (ref 8.4–10.2)
CHLORIDE SERPL-SCNC: 109 MMOL/L (ref 98–107)
CO2 SERPL-SCNC: 23 MMOL/L (ref 22–30)
CREAT SERPL-SCNC: 0.89 UMOL/L (ref 0.66–1.25)
EOSINOPHIL # BLD: 0.2 10*3/UL (ref 0–0.7)
EOSINOPHIL NFR BLD: 7.3 % (ref 0–4)
ERYTHROCYTE [DISTWIDTH] IN BLOOD BY AUTOMATED COUNT: 17 % (ref 11.5–14.5)
GLUCOSE SERPL-MCNC: 112 MG/DL (ref 74–106)
GRANULOCYTES # BLD AUTO: 53 % (ref 42.2–75.2)
HCT VFR BLD AUTO: 26.1 % (ref 42–52)
HGB BLD-MCNC: 8.6 G/DL (ref 13.5–18)
INR BLD: 2 (ref 0.8–3)
LYMPHOCYTES # BLD: 0.6 10*3/UL (ref 1.2–3.4)
LYMPHOCYTES NFR BLD: 22.6 % (ref 20–51)
MAGNESIUM SERPL-MCNC: 2.1 MG/DL (ref 1.6–2.3)
MCH RBC QN AUTO: 33 PG (ref 27–31)
MCHC RBC AUTO-ENTMCNC: 33 G/DL (ref 33–37)
MCV RBC AUTO: 101 FL (ref 80–100)
MONOCYTES # BLD: 0.4 10*3/UL (ref 0.1–0.6)
MONOCYTES NFR BLD AUTO: 15.7 % (ref 1.7–9.3)
NEUTROPHILS # BLD: 1.5 10*3/UL (ref 1.4–6.5)
PLATELET # BLD AUTO: 64 K/MM3 (ref 130–400)
PMV BLD AUTO: 11.1 FL (ref 7.4–10.4)
POTASSIUM SERPL-SCNC: 4.2 MMOL/L (ref 3.4–5)
PROT SERPL-MCNC: 6.3 GM/DL (ref 6.4–8.2)
PROTHROMBIN TIME: 22.9 SECONDS (ref 9.7–12.8)
RBC # BLD AUTO: 2.59 M/MM3 (ref 4.2–5.6)
SODIUM SERPL-SCNC: 136 MMOL/L (ref 137–145)

## 2021-03-31 NOTE — NUR
Initial visit; Patient and his wife thanked  for looking in on Lisandro
and offering empathy and prayer for him.

## 2021-03-31 NOTE — NUR
attended clinical rounds with the team and patient will return
home today. SW met with patient and patient's wife to check in. Patient states
he is ready to go home and has no concerns doing so. Patient and patient's
wife have no further questions at this time.

## 2021-05-10 ENCOUNTER — HOSPITAL ENCOUNTER (OUTPATIENT)
Dept: HOSPITAL 19 - COL.ER | Age: 71
Setting detail: OBSERVATION
LOS: 2 days | Discharge: HOME | End: 2021-05-12
Attending: STUDENT IN AN ORGANIZED HEALTH CARE EDUCATION/TRAINING PROGRAM | Admitting: STUDENT IN AN ORGANIZED HEALTH CARE EDUCATION/TRAINING PROGRAM
Payer: MEDICARE

## 2021-05-10 VITALS — SYSTOLIC BLOOD PRESSURE: 132 MMHG | HEART RATE: 95 BPM | TEMPERATURE: 98.6 F | DIASTOLIC BLOOD PRESSURE: 65 MMHG

## 2021-05-10 VITALS — DIASTOLIC BLOOD PRESSURE: 61 MMHG | SYSTOLIC BLOOD PRESSURE: 139 MMHG | HEART RATE: 93 BPM | TEMPERATURE: 98.9 F

## 2021-05-10 VITALS — HEART RATE: 93 BPM | SYSTOLIC BLOOD PRESSURE: 126 MMHG | TEMPERATURE: 98.9 F | DIASTOLIC BLOOD PRESSURE: 54 MMHG

## 2021-05-10 VITALS — SYSTOLIC BLOOD PRESSURE: 118 MMHG | DIASTOLIC BLOOD PRESSURE: 47 MMHG | TEMPERATURE: 98.2 F | HEART RATE: 87 BPM

## 2021-05-10 VITALS — HEIGHT: 69.02 IN | WEIGHT: 208.34 LBS | BODY MASS INDEX: 30.86 KG/M2

## 2021-05-10 VITALS — SYSTOLIC BLOOD PRESSURE: 117 MMHG | DIASTOLIC BLOOD PRESSURE: 63 MMHG | TEMPERATURE: 98.3 F | HEART RATE: 114 BPM

## 2021-05-10 VITALS — SYSTOLIC BLOOD PRESSURE: 137 MMHG | HEART RATE: 98 BPM | DIASTOLIC BLOOD PRESSURE: 71 MMHG | TEMPERATURE: 98.2 F

## 2021-05-10 DIAGNOSIS — K74.60: ICD-10-CM

## 2021-05-10 DIAGNOSIS — E11.9: ICD-10-CM

## 2021-05-10 DIAGNOSIS — K64.2: ICD-10-CM

## 2021-05-10 DIAGNOSIS — Z87.19: ICD-10-CM

## 2021-05-10 DIAGNOSIS — Z88.7: ICD-10-CM

## 2021-05-10 DIAGNOSIS — K63.89: ICD-10-CM

## 2021-05-10 DIAGNOSIS — Z87.891: ICD-10-CM

## 2021-05-10 DIAGNOSIS — K21.9: ICD-10-CM

## 2021-05-10 DIAGNOSIS — I10: ICD-10-CM

## 2021-05-10 DIAGNOSIS — R18.8: ICD-10-CM

## 2021-05-10 DIAGNOSIS — K27.9: ICD-10-CM

## 2021-05-10 DIAGNOSIS — D69.6: ICD-10-CM

## 2021-05-10 DIAGNOSIS — Z79.899: ICD-10-CM

## 2021-05-10 DIAGNOSIS — E87.2: ICD-10-CM

## 2021-05-10 DIAGNOSIS — K92.1: Primary | ICD-10-CM

## 2021-05-10 DIAGNOSIS — E87.6: ICD-10-CM

## 2021-05-10 DIAGNOSIS — K64.4: ICD-10-CM

## 2021-05-10 DIAGNOSIS — D64.9: ICD-10-CM

## 2021-05-10 DIAGNOSIS — D68.9: ICD-10-CM

## 2021-05-10 LAB
ALBUMIN SERPL-MCNC: 2.7 GM/DL (ref 3.5–5)
ALP SERPL-CCNC: 164 U/L (ref 50–136)
ALT SERPL-CCNC: 32 U/L (ref 4–49)
ANION GAP SERPL CALC-SCNC: 10 MMOL/L (ref 7–16)
AST SERPL-CCNC: 94 U/L (ref 15–37)
BASOPHILS # BLD: 0.1 10*3/UL (ref 0–0.2)
BASOPHILS NFR BLD AUTO: 0.9 % (ref 0–2)
BILIRUB SERPL-MCNC: 4.7 MG/DL (ref 0–1)
BUN SERPL-MCNC: 15 MG/DL (ref 9–20)
CALCIUM SERPL-MCNC: 8.4 MG/DL (ref 8.4–10.2)
CHLORIDE SERPL-SCNC: 98 MMOL/L (ref 98–107)
CO2 SERPL-SCNC: 24 MMOL/L (ref 22–30)
CREAT SERPL-SCNC: 0.79 UMOL/L (ref 0.66–1.25)
EOSINOPHIL # BLD: 0.2 10*3/UL (ref 0–0.7)
EOSINOPHIL NFR BLD: 3.8 % (ref 0–4)
ERYTHROCYTE [DISTWIDTH] IN BLOOD BY AUTOMATED COUNT: 15.8 % (ref 11.5–14.5)
GLUCOSE SERPL-MCNC: 121 MG/DL (ref 74–106)
GRANULOCYTES # BLD AUTO: 62.8 % (ref 42.2–75.2)
HCT VFR BLD AUTO: 23.4 % (ref 42–52)
HCT VFR BLD AUTO: 24.7 % (ref 42–52)
HCT VFR BLD AUTO: 27.5 % (ref 42–52)
HGB BLD-MCNC: 8.1 G/DL (ref 13.5–18)
HGB BLD-MCNC: 8.5 G/DL (ref 13.5–18)
HGB BLD-MCNC: 9.6 G/DL (ref 13.5–18)
INR BLD: 2.4 (ref 0.8–3)
LYMPHOCYTES # BLD: 1.3 10*3/UL (ref 1.2–3.4)
LYMPHOCYTES NFR BLD: 21.4 % (ref 20–51)
MCH RBC QN AUTO: 34 PG (ref 27–31)
MCHC RBC AUTO-ENTMCNC: 35 G/DL (ref 33–37)
MCV RBC AUTO: 99 FL (ref 80–100)
MONOCYTES # BLD: 0.6 10*3/UL (ref 0.1–0.6)
MONOCYTES NFR BLD AUTO: 10.8 % (ref 1.7–9.3)
NEUTROPHILS # BLD: 3.7 10*3/UL (ref 1.4–6.5)
PLATELET # BLD AUTO: 114 K/MM3 (ref 130–400)
PMV BLD AUTO: 10.5 FL (ref 7.4–10.4)
POTASSIUM SERPL-SCNC: 3.5 MMOL/L (ref 3.4–5)
PROT SERPL-MCNC: 7.7 GM/DL (ref 6.4–8.2)
PROTHROMBIN TIME: 26.8 SECONDS (ref 9.7–12.8)
RBC # BLD AUTO: 2.79 M/MM3 (ref 4.2–5.6)
SODIUM SERPL-SCNC: 132 MMOL/L (ref 137–145)

## 2021-05-10 PROCEDURE — G0378 HOSPITAL OBSERVATION PER HR: HCPCS

## 2021-05-10 NOTE — NUR
Patient in bed resting. Alert and oriented x 3. Assessment complete. Denies
pain at this time. Denies further needs at this time. Spouse at bedside.

## 2021-05-10 NOTE — NUR
Patient up to room 323 via stretcher. Oriented to room and call light.
Admission and assessment complete. Started on potassium protocol. PAtient has
no other needs at this time. Call light in reach.

## 2021-05-10 NOTE — NUR
JOANN met with the patient and his wife, Mila (ph#[phone]), to discuss
discharge plan. The patient lives in [place] with his wife, son (Andrew,
ph#[phone]), and grandson. He reports independence with ADLs and does not
have any DME. The patient's PCP is Dr. Jose E Le and he receives his
medications from [facility]. He reports no difficulties obtaining his meds. The
patient's DPOA-HC is in EMR and it designates his wife. The patient plans to
return home with his family upon discharge. No additional needs at this time.
 
*Discharge plan: home with family*

## 2021-05-10 NOTE — NUR
Patient doing well throughout the day, spouse at bedside. Patient working on
drinking bowel prep. Denies pain throughout the day. Denies further needs at
this time. Will report off to night shift.

## 2021-05-11 VITALS — DIASTOLIC BLOOD PRESSURE: 54 MMHG | HEART RATE: 87 BPM | SYSTOLIC BLOOD PRESSURE: 111 MMHG

## 2021-05-11 VITALS — SYSTOLIC BLOOD PRESSURE: 141 MMHG | HEART RATE: 87 BPM | TEMPERATURE: 98.3 F | DIASTOLIC BLOOD PRESSURE: 69 MMHG

## 2021-05-11 VITALS — DIASTOLIC BLOOD PRESSURE: 46 MMHG | HEART RATE: 90 BPM | TEMPERATURE: 98.1 F | SYSTOLIC BLOOD PRESSURE: 113 MMHG

## 2021-05-11 VITALS — SYSTOLIC BLOOD PRESSURE: 126 MMHG | HEART RATE: 88 BPM | DIASTOLIC BLOOD PRESSURE: 55 MMHG

## 2021-05-11 VITALS — HEART RATE: 99 BPM | TEMPERATURE: 98.4 F | DIASTOLIC BLOOD PRESSURE: 53 MMHG | SYSTOLIC BLOOD PRESSURE: 113 MMHG

## 2021-05-11 VITALS — DIASTOLIC BLOOD PRESSURE: 47 MMHG | HEART RATE: 94 BPM | SYSTOLIC BLOOD PRESSURE: 107 MMHG

## 2021-05-11 VITALS — SYSTOLIC BLOOD PRESSURE: 109 MMHG | DIASTOLIC BLOOD PRESSURE: 47 MMHG | HEART RATE: 96 BPM

## 2021-05-11 VITALS — HEART RATE: 95 BPM | SYSTOLIC BLOOD PRESSURE: 114 MMHG | TEMPERATURE: 98.2 F | DIASTOLIC BLOOD PRESSURE: 55 MMHG

## 2021-05-11 VITALS — DIASTOLIC BLOOD PRESSURE: 53 MMHG | HEART RATE: 98 BPM | SYSTOLIC BLOOD PRESSURE: 127 MMHG

## 2021-05-11 VITALS — TEMPERATURE: 98.2 F | SYSTOLIC BLOOD PRESSURE: 116 MMHG | DIASTOLIC BLOOD PRESSURE: 52 MMHG | HEART RATE: 94 BPM

## 2021-05-11 VITALS — HEART RATE: 89 BPM | DIASTOLIC BLOOD PRESSURE: 57 MMHG | SYSTOLIC BLOOD PRESSURE: 125 MMHG

## 2021-05-11 LAB
ALBUMIN SERPL-MCNC: 2.3 GM/DL (ref 3.5–5)
ALP SERPL-CCNC: 101 U/L (ref 50–136)
ALT SERPL-CCNC: 28 U/L (ref 4–49)
ANION GAP SERPL CALC-SCNC: 5 MMOL/L (ref 7–16)
AST SERPL-CCNC: 86 U/L (ref 15–37)
BASOPHILS # BLD: 0 10*3/UL (ref 0–0.2)
BASOPHILS NFR BLD AUTO: 0.8 % (ref 0–2)
BILIRUB DIRECT SERPL-MCNC: 1.5 MG/DL (ref 0–0.4)
BILIRUB INDIRECT SERPL-MCNC: 3.5 MG/DL (ref 0–1.1)
BILIRUB SERPL-MCNC: 5 MG/DL (ref 0–1)
BUN SERPL-MCNC: 10 MG/DL (ref 9–20)
CALCIUM SERPL-MCNC: 7.6 MG/DL (ref 8.4–10.2)
CHLORIDE SERPL-SCNC: 99 MMOL/L (ref 98–107)
CO2 SERPL-SCNC: 28 MMOL/L (ref 22–30)
CREAT SERPL-SCNC: 0.71 UMOL/L (ref 0.66–1.25)
EOSINOPHIL # BLD: 0.2 10*3/UL (ref 0–0.7)
EOSINOPHIL NFR BLD: 4.1 % (ref 0–4)
ERYTHROCYTE [DISTWIDTH] IN BLOOD BY AUTOMATED COUNT: 16 % (ref 11.5–14.5)
GLUCOSE SERPL-MCNC: 113 MG/DL (ref 74–106)
GRANULOCYTES # BLD AUTO: 59.4 % (ref 42.2–75.2)
HCT VFR BLD AUTO: 22.6 % (ref 42–52)
HCT VFR BLD AUTO: 24 % (ref 42–52)
HGB BLD-MCNC: 7.8 G/DL (ref 13.5–18)
HGB BLD-MCNC: 7.9 G/DL (ref 13.5–18)
INR BLD: 2.4 (ref 0.8–3)
LYMPHOCYTES # BLD: 0.9 10*3/UL (ref 1.2–3.4)
LYMPHOCYTES NFR BLD: 22.4 % (ref 20–51)
MCH RBC QN AUTO: 34 PG (ref 27–31)
MCHC RBC AUTO-ENTMCNC: 35 G/DL (ref 33–37)
MCV RBC AUTO: 100 FL (ref 80–100)
MONOCYTES # BLD: 0.5 10*3/UL (ref 0.1–0.6)
MONOCYTES NFR BLD AUTO: 12.5 % (ref 1.7–9.3)
NEUTROPHILS # BLD: 2.3 10*3/UL (ref 1.4–6.5)
PLATELET # BLD AUTO: 90 K/MM3 (ref 130–400)
PMV BLD AUTO: 10.8 FL (ref 7.4–10.4)
POTASSIUM SERPL-SCNC: 3.8 MMOL/L (ref 3.4–5)
PROT SERPL-MCNC: 6.5 GM/DL (ref 6.4–8.2)
PROTHROMBIN TIME: 27.1 SECONDS (ref 9.7–12.8)
RBC # BLD AUTO: 2.27 M/MM3 (ref 4.2–5.6)
SODIUM SERPL-SCNC: 132 MMOL/L (ref 137–145)

## 2021-05-11 NOTE — NUR
Initial visit; Patient and wife thanked  for looking in on him and
offering God's blessings, a good recovery and healing.

## 2021-05-12 VITALS — HEART RATE: 88 BPM | DIASTOLIC BLOOD PRESSURE: 60 MMHG | TEMPERATURE: 97.9 F | SYSTOLIC BLOOD PRESSURE: 110 MMHG

## 2021-05-12 VITALS — SYSTOLIC BLOOD PRESSURE: 120 MMHG | DIASTOLIC BLOOD PRESSURE: 63 MMHG | TEMPERATURE: 97.8 F | HEART RATE: 84 BPM

## 2021-05-12 LAB
ANION GAP SERPL CALC-SCNC: 3 MMOL/L (ref 7–16)
BASOPHILS # BLD: 0 10*3/UL (ref 0–0.2)
BASOPHILS NFR BLD AUTO: 0.3 % (ref 0–2)
BUN SERPL-MCNC: 11 MG/DL (ref 9–20)
CALCIUM SERPL-MCNC: 7.9 MG/DL (ref 8.4–10.2)
CHLORIDE SERPL-SCNC: 99 MMOL/L (ref 98–107)
CO2 SERPL-SCNC: 26 MMOL/L (ref 22–30)
CREAT SERPL-SCNC: 0.71 UMOL/L (ref 0.66–1.25)
EOSINOPHIL # BLD: 0.1 10*3/UL (ref 0–0.7)
EOSINOPHIL NFR BLD: 2.4 % (ref 0–4)
ERYTHROCYTE [DISTWIDTH] IN BLOOD BY AUTOMATED COUNT: 15.9 % (ref 11.5–14.5)
GLUCOSE SERPL-MCNC: 138 MG/DL (ref 74–106)
GRANULOCYTES # BLD AUTO: 70.2 % (ref 42.2–75.2)
HCT VFR BLD AUTO: 21.1 % (ref 42–52)
HGB BLD-MCNC: 7.2 G/DL (ref 13.5–18)
LYMPHOCYTES # BLD: 0.5 10*3/UL (ref 1.2–3.4)
LYMPHOCYTES NFR BLD: 16.3 % (ref 20–51)
MCH RBC QN AUTO: 34 PG (ref 27–31)
MCHC RBC AUTO-ENTMCNC: 34 G/DL (ref 33–37)
MCV RBC AUTO: 100 FL (ref 80–100)
MONOCYTES # BLD: 0.3 10*3/UL (ref 0.1–0.6)
MONOCYTES NFR BLD AUTO: 10.5 % (ref 1.7–9.3)
NEUTROPHILS # BLD: 2.1 10*3/UL (ref 1.4–6.5)
PLATELET # BLD AUTO: 70 K/MM3 (ref 130–400)
PMV BLD AUTO: 10.9 FL (ref 7.4–10.4)
POTASSIUM SERPL-SCNC: 3.9 MMOL/L (ref 3.4–5)
RBC # BLD AUTO: 2.11 M/MM3 (ref 4.2–5.6)
SODIUM SERPL-SCNC: 129 MMOL/L (ref 137–145)

## 2021-05-12 NOTE — NUR
has been in and wrote discharge orders for patient.  Patient was eager to
leave. Reviewed discharge instructions with pt and his wife.  Was not able to
get ahold of offices for follow up appointment, pt stated that would make the
appointments as they are ready to leave.  INT in left wrist removed.  Pt
escorted out.

## 2021-05-17 ENCOUNTER — HOSPITAL ENCOUNTER (EMERGENCY)
Dept: HOSPITAL 19 - COL.ER | Age: 71
Discharge: HOME | End: 2021-05-17
Attending: FAMILY MEDICINE
Payer: MEDICARE

## 2021-05-17 VITALS — HEIGHT: 69.02 IN | WEIGHT: 208.34 LBS | BODY MASS INDEX: 30.86 KG/M2

## 2021-05-17 VITALS — DIASTOLIC BLOOD PRESSURE: 74 MMHG | HEART RATE: 95 BPM | SYSTOLIC BLOOD PRESSURE: 100 MMHG

## 2021-05-17 VITALS — TEMPERATURE: 98.1 F

## 2021-05-17 DIAGNOSIS — I10: ICD-10-CM

## 2021-05-17 DIAGNOSIS — Z87.891: ICD-10-CM

## 2021-05-17 DIAGNOSIS — K21.9: ICD-10-CM

## 2021-05-17 DIAGNOSIS — D64.9: ICD-10-CM

## 2021-05-17 DIAGNOSIS — R18.8: Primary | ICD-10-CM

## 2021-05-17 DIAGNOSIS — Z79.899: ICD-10-CM

## 2021-05-17 DIAGNOSIS — Z88.7: ICD-10-CM

## 2021-05-17 DIAGNOSIS — E11.9: ICD-10-CM

## 2021-05-17 LAB
ALBUMIN SERPL-MCNC: 2.5 GM/DL (ref 3.5–5)
ALP SERPL-CCNC: 143 U/L (ref 50–136)
ALT SERPL-CCNC: 45 U/L (ref 4–49)
ANION GAP SERPL CALC-SCNC: 8 MMOL/L (ref 7–16)
AST SERPL-CCNC: 137 U/L (ref 15–37)
BASOPHILS # BLD: 0 10*3/UL (ref 0–0.2)
BASOPHILS NFR BLD AUTO: 0.6 % (ref 0–2)
BILIRUB SERPL-MCNC: 5.7 MG/DL (ref 0–1)
BUN SERPL-MCNC: 13 MG/DL (ref 9–20)
CALCIUM SERPL-MCNC: 8.3 MG/DL (ref 8.4–10.2)
CHLORIDE SERPL-SCNC: 97 MMOL/L (ref 98–107)
CO2 SERPL-SCNC: 26 MMOL/L (ref 22–30)
CREAT SERPL-SCNC: 0.72 UMOL/L (ref 0.66–1.25)
EOSINOPHIL # BLD: 0.3 10*3/UL (ref 0–0.7)
EOSINOPHIL NFR BLD: 4.5 % (ref 0–4)
ERYTHROCYTE [DISTWIDTH] IN BLOOD BY AUTOMATED COUNT: 16.2 % (ref 11.5–14.5)
GLUCOSE SERPL-MCNC: 104 MG/DL (ref 74–106)
GRANULOCYTES # BLD AUTO: 60.7 % (ref 42.2–75.2)
HCT VFR BLD AUTO: 25.9 % (ref 42–52)
HGB BLD-MCNC: 8.8 G/DL (ref 13.5–18)
INR BLD: 2.4 (ref 0.8–3)
LIPASE SERPL-CCNC: 186 U/L (ref 23–300)
LYMPHOCYTES # BLD: 1.5 10*3/UL (ref 1.2–3.4)
LYMPHOCYTES NFR BLD: 22 % (ref 20–51)
MCH RBC QN AUTO: 33 PG (ref 27–31)
MCHC RBC AUTO-ENTMCNC: 34 G/DL (ref 33–37)
MCV RBC AUTO: 99 FL (ref 80–100)
MONOCYTES # BLD: 0.8 10*3/UL (ref 0.1–0.6)
MONOCYTES NFR BLD AUTO: 11.5 % (ref 1.7–9.3)
NEUTROPHILS # BLD: 4.1 10*3/UL (ref 1.4–6.5)
PH UR STRIP.AUTO: 5 [PH] (ref 5–8)
PLATELET # BLD AUTO: 111 K/MM3 (ref 130–400)
PMV BLD AUTO: 10.7 FL (ref 7.4–10.4)
POTASSIUM SERPL-SCNC: 4.3 MMOL/L (ref 3.4–5)
PROT SERPL-MCNC: 7.3 GM/DL (ref 6.4–8.2)
PROTHROMBIN TIME: 26.3 SECONDS (ref 9.7–12.8)
RBC # BLD AUTO: 2.63 M/MM3 (ref 4.2–5.6)
RBC # UR: (no result) /HPF
SODIUM SERPL-SCNC: 131 MMOL/L (ref 137–145)
SP GR UR STRIP.AUTO: 1.02 (ref 1–1.03)
SQUAMOUS # URNS: (no result) /HPF
URN COLLECT METHOD CLASS: (no result)
UROBILINOGEN UR STRIP.AUTO-MCNC: 2 MG/DL

## 2021-05-18 ENCOUNTER — HOSPITAL ENCOUNTER (INPATIENT)
Dept: HOSPITAL 19 - COL.ER | Age: 71
LOS: 4 days | Discharge: ANOTHER HOSPITAL | DRG: 871 | End: 2021-05-22
Attending: HOSPITALIST | Admitting: HOSPITALIST
Payer: MEDICARE

## 2021-05-18 VITALS — DIASTOLIC BLOOD PRESSURE: 71 MMHG | HEART RATE: 89 BPM | SYSTOLIC BLOOD PRESSURE: 128 MMHG | TEMPERATURE: 97.5 F

## 2021-05-18 VITALS — HEART RATE: 87 BPM | SYSTOLIC BLOOD PRESSURE: 91 MMHG | TEMPERATURE: 97.3 F | DIASTOLIC BLOOD PRESSURE: 69 MMHG

## 2021-05-18 VITALS — DIASTOLIC BLOOD PRESSURE: 71 MMHG | HEART RATE: 89 BPM | TEMPERATURE: 97.5 F | SYSTOLIC BLOOD PRESSURE: 128 MMHG

## 2021-05-18 VITALS — WEIGHT: 213.41 LBS | BODY MASS INDEX: 31.61 KG/M2 | HEIGHT: 69.02 IN

## 2021-05-18 DIAGNOSIS — K65.2: ICD-10-CM

## 2021-05-18 DIAGNOSIS — K21.9: ICD-10-CM

## 2021-05-18 DIAGNOSIS — D69.6: ICD-10-CM

## 2021-05-18 DIAGNOSIS — K74.60: ICD-10-CM

## 2021-05-18 DIAGNOSIS — A41.51: Primary | ICD-10-CM

## 2021-05-18 DIAGNOSIS — E87.2: ICD-10-CM

## 2021-05-18 DIAGNOSIS — E11.9: ICD-10-CM

## 2021-05-18 DIAGNOSIS — I95.9: ICD-10-CM

## 2021-05-18 DIAGNOSIS — Z87.891: ICD-10-CM

## 2021-05-18 DIAGNOSIS — Z87.11: ICD-10-CM

## 2021-05-18 DIAGNOSIS — R53.81: ICD-10-CM

## 2021-05-18 DIAGNOSIS — D61.818: ICD-10-CM

## 2021-05-18 DIAGNOSIS — D50.0: ICD-10-CM

## 2021-05-18 DIAGNOSIS — R18.8: ICD-10-CM

## 2021-05-18 DIAGNOSIS — D68.9: ICD-10-CM

## 2021-05-18 LAB
ALBUMIN SERPL-MCNC: 2.3 GM/DL (ref 3.5–5)
ALP SERPL-CCNC: 118 U/L (ref 50–136)
ALT SERPL-CCNC: 36 U/L (ref 4–49)
ANION GAP SERPL CALC-SCNC: 10 MMOL/L (ref 7–16)
APPEARANCE PRT: CLEAR
AST SERPL-CCNC: 98 U/L (ref 15–37)
BASOPHILS # BLD: 0 10*3/UL (ref 0–0.2)
BASOPHILS NFR BLD AUTO: 0.3 % (ref 0–2)
BILIRUB SERPL-MCNC: 6.3 MG/DL (ref 0–1)
BUN SERPL-MCNC: 16 MG/DL (ref 9–20)
CALCIUM SERPL-MCNC: 7.8 MG/DL (ref 8.4–10.2)
CHLORIDE SERPL-SCNC: 99 MMOL/L (ref 98–107)
CO2 SERPL-SCNC: 21 MMOL/L (ref 22–30)
CREAT SERPL-SCNC: 0.91 UMOL/L (ref 0.66–1.25)
EOSINOPHIL # BLD: 0.2 10*3/UL (ref 0–0.7)
EOSINOPHIL NFR BLD: 2.1 % (ref 0–4)
ERYTHROCYTE [DISTWIDTH] IN BLOOD BY AUTOMATED COUNT: 16.3 % (ref 11.5–14.5)
GLUCOSE SERPL-MCNC: 138 MG/DL (ref 74–106)
GRANULOCYTES # BLD AUTO: 75.1 % (ref 42.2–75.2)
GRANULOCYTES # PRT: 49.8 % (ref 0–25)
HCT VFR BLD AUTO: 25.8 % (ref 42–52)
HGB BLD-MCNC: 8.9 G/DL (ref 13.5–18)
INR BLD: 2.5 (ref 0.8–3)
LYMPHOCYTES # BLD: 1 10*3/UL (ref 1.2–3.4)
LYMPHOCYTES NFR BLD: 11.7 % (ref 20–51)
MCH RBC QN AUTO: 34 PG (ref 27–31)
MCHC RBC AUTO-ENTMCNC: 35 G/DL (ref 33–37)
MCV RBC AUTO: 99 FL (ref 80–100)
MONOCYTES # BLD: 0.8 10*3/UL (ref 0.1–0.6)
MONOCYTES NFR BLD AUTO: 9.5 % (ref 1.7–9.3)
MONONUC CELLS # PRT: 50.2 % (ref 0–75)
NEUTROPHILS # BLD: 6.5 10*3/UL (ref 1.4–6.5)
PERITONEAL FLUID RBC: 2000 /MM3 (ref 0–0)
PLATELET # BLD AUTO: 118 K/MM3 (ref 130–400)
PMV BLD AUTO: 11.7 FL (ref 7.4–10.4)
POTASSIUM SERPL-SCNC: 4.2 MMOL/L (ref 3.4–5)
PROT SERPL-MCNC: 6.8 GM/DL (ref 6.4–8.2)
PROTHROMBIN TIME: 28.2 SECONDS (ref 9.7–12.8)
RBC # BLD AUTO: 2.6 M/MM3 (ref 4.2–5.6)
SODIUM SERPL-SCNC: 130 MMOL/L (ref 137–145)
WBC # PRT AUTO: 600 /MM3 (ref 0–1000)

## 2021-05-18 PROCEDURE — C1751 CATH, INF, PER/CENT/MIDLINE: HCPCS

## 2021-05-18 PROCEDURE — P9016 RBC LEUKOCYTES REDUCED: HCPCS

## 2021-05-18 PROCEDURE — P9047 ALBUMIN (HUMAN), 25%, 50ML: HCPCS

## 2021-05-18 PROCEDURE — P9012 CRYOPRECIPITATE EACH UNIT: HCPCS

## 2021-05-19 VITALS — HEART RATE: 95 BPM | DIASTOLIC BLOOD PRESSURE: 59 MMHG | TEMPERATURE: 99.4 F | SYSTOLIC BLOOD PRESSURE: 122 MMHG

## 2021-05-19 VITALS — DIASTOLIC BLOOD PRESSURE: 59 MMHG | SYSTOLIC BLOOD PRESSURE: 114 MMHG | HEART RATE: 94 BPM | TEMPERATURE: 98.4 F

## 2021-05-19 VITALS — HEART RATE: 94 BPM | TEMPERATURE: 98.2 F | SYSTOLIC BLOOD PRESSURE: 125 MMHG | DIASTOLIC BLOOD PRESSURE: 58 MMHG

## 2021-05-19 VITALS — SYSTOLIC BLOOD PRESSURE: 119 MMHG | DIASTOLIC BLOOD PRESSURE: 57 MMHG | HEART RATE: 98 BPM

## 2021-05-19 VITALS — DIASTOLIC BLOOD PRESSURE: 55 MMHG | SYSTOLIC BLOOD PRESSURE: 133 MMHG | TEMPERATURE: 98.7 F | HEART RATE: 100 BPM

## 2021-05-19 VITALS — SYSTOLIC BLOOD PRESSURE: 144 MMHG | TEMPERATURE: 98.6 F | HEART RATE: 103 BPM | DIASTOLIC BLOOD PRESSURE: 68 MMHG

## 2021-05-19 VITALS — TEMPERATURE: 98.6 F | SYSTOLIC BLOOD PRESSURE: 123 MMHG | DIASTOLIC BLOOD PRESSURE: 67 MMHG | HEART RATE: 92 BPM

## 2021-05-19 VITALS — HEART RATE: 95 BPM | SYSTOLIC BLOOD PRESSURE: 124 MMHG | DIASTOLIC BLOOD PRESSURE: 66 MMHG

## 2021-05-19 VITALS — SYSTOLIC BLOOD PRESSURE: 116 MMHG | TEMPERATURE: 97.8 F | HEART RATE: 87 BPM | DIASTOLIC BLOOD PRESSURE: 65 MMHG

## 2021-05-19 LAB
BASOPHILS # BLD: 0 10*3/UL (ref 0–0.2)
BASOPHILS NFR BLD AUTO: 0.4 % (ref 0–2)
EOSINOPHIL # BLD: 0.1 10*3/UL (ref 0–0.7)
EOSINOPHIL NFR BLD: 2.2 % (ref 0–4)
ERYTHROCYTE [DISTWIDTH] IN BLOOD BY AUTOMATED COUNT: 16.4 % (ref 11.5–14.5)
GRANULOCYTES # BLD AUTO: 69.3 % (ref 42.2–75.2)
HCT VFR BLD AUTO: 19.6 % (ref 42–52)
HCT VFR BLD AUTO: 21.2 % (ref 42–52)
HCT VFR BLD AUTO: 23.3 % (ref 42–52)
HGB BLD-MCNC: 6.6 G/DL (ref 13.5–18)
HGB BLD-MCNC: 6.9 G/DL (ref 13.5–18)
HGB BLD-MCNC: 7.7 G/DL (ref 13.5–18)
LYMPHOCYTES # BLD: 0.5 10*3/UL (ref 1.2–3.4)
LYMPHOCYTES NFR BLD: 16.6 % (ref 20–51)
MCH RBC QN AUTO: 34 PG (ref 27–31)
MCHC RBC AUTO-ENTMCNC: 34 G/DL (ref 33–37)
MCV RBC AUTO: 100 FL (ref 80–100)
MONOCYTES # BLD: 0.3 10*3/UL (ref 0.1–0.6)
MONOCYTES NFR BLD AUTO: 10.8 % (ref 1.7–9.3)
NEUTROPHILS # BLD: 1.9 10*3/UL (ref 1.4–6.5)
PH UR STRIP.AUTO: 5 [PH] (ref 5–8)
PLATELET # BLD AUTO: 61 K/MM3 (ref 130–400)
PMV BLD AUTO: 10.9 FL (ref 7.4–10.4)
RBC # BLD AUTO: 1.97 M/MM3 (ref 4.2–5.6)
RBC # UR: (no result) /HPF
SP GR UR STRIP.AUTO: 1.01 (ref 1–1.03)
SQUAMOUS # URNS: (no result) /HPF
URN COLLECT METHOD CLASS: (no result)

## 2021-05-19 NOTE — NUR
Pt received 1U PRBCs earlier this shift. Verified with 2nd RN. Pt educated on
s/sx of a transfusion reaction. Denied any symptoms. Maintained slow rate for
first 15 minutes and then increased as tolerated. Pt has rested off and on
this afternoon, denies pain, eating small amounts of food, minimal appetite.
Discussed plan for head CT. Will give bedsisde shift report to nightshift
nurse who will resume care.

## 2021-05-19 NOTE — NUR
Initial visit (this stay); Patient and his wife thanked  for offering
encouragement and spiritual care.

## 2021-05-19 NOTE — NUR
met with the patient and the patient's wife, Mila to complete
intake. The patient lives with Mila, their son, daughter-in-law, and 6 year
old grandson. The patient denies DME use and is mostly independent, Mila
assist if needed. The patient's PCP is Dr. Le. The patient sees Dr. Higgins, a
liver specialist from [facility]. [facility] and via mail from [facility]. The patient has advanced directives in the
EMR. The patient plans to return home at discharge.
 
*Discharge disposition: Home with family

## 2021-05-19 NOTE — NUR
Rested off and on this shift. Tolerated clear liquids. Voiding without
difficulty. Denied pain/nausea/shortness fo breath. VS remained stable. Denies
current needs. Call light in reach. Will monitor.

## 2021-05-19 NOTE — NUR
Assessment charted. Pt updated on labs results and orders for blood
transfusion, Dr. Ross to see pt. Per her she states his occult will be
positive regardless d/t hemmerhoids and recent scopes so cancel the occult
blood. Pt states he had a small BM this morning and it was normal and not dark
or bloody.  IV albumin to RFA. Urinal provided to quantify output. ABd
distended and firm. Wife arrived late at bedside and called Laine on her cell
so she could provide update. Pt denies pain. Will ocnitnue to monitor and
transfuse 1 U PRBCs once albumin completed.

## 2021-05-19 NOTE — NUR
Report received, assumed care for night shift. Assessment complete. VS stable.
A&Ox3-drowsy. Denies pain/nausea/shortness of breath. Right forearm INT
flushes without difficulty. Voiding without difficulty. Very excited that he
has made it on the liver transplant list.  Plan of care discussed for this
shift to include HS meds/pain meds/CT of head/calling for questions/concerns.
Call light in reach. Will Monitor.

## 2021-05-20 VITALS — SYSTOLIC BLOOD PRESSURE: 125 MMHG | TEMPERATURE: 99.3 F | HEART RATE: 95 BPM | DIASTOLIC BLOOD PRESSURE: 54 MMHG

## 2021-05-20 VITALS — SYSTOLIC BLOOD PRESSURE: 125 MMHG | TEMPERATURE: 98.5 F | HEART RATE: 87 BPM | DIASTOLIC BLOOD PRESSURE: 73 MMHG

## 2021-05-20 VITALS — DIASTOLIC BLOOD PRESSURE: 70 MMHG | HEART RATE: 87 BPM | TEMPERATURE: 98.4 F | SYSTOLIC BLOOD PRESSURE: 114 MMHG

## 2021-05-20 VITALS — DIASTOLIC BLOOD PRESSURE: 51 MMHG | HEART RATE: 91 BPM | SYSTOLIC BLOOD PRESSURE: 114 MMHG | TEMPERATURE: 99.2 F

## 2021-05-20 VITALS — TEMPERATURE: 98.1 F | SYSTOLIC BLOOD PRESSURE: 135 MMHG | HEART RATE: 95 BPM | DIASTOLIC BLOOD PRESSURE: 60 MMHG

## 2021-05-20 VITALS — HEART RATE: 80 BPM | DIASTOLIC BLOOD PRESSURE: 49 MMHG | SYSTOLIC BLOOD PRESSURE: 107 MMHG | TEMPERATURE: 98.2 F

## 2021-05-20 LAB
ALBUMIN SERPL-MCNC: 2.5 GM/DL (ref 3.5–5)
ALP SERPL-CCNC: 94 U/L (ref 50–136)
ALT SERPL-CCNC: 25 U/L (ref 4–49)
ANION GAP SERPL CALC-SCNC: 6 MMOL/L (ref 7–16)
AST SERPL-CCNC: 62 U/L (ref 15–37)
BASOPHILS # BLD: 0 10*3/UL (ref 0–0.2)
BASOPHILS NFR BLD AUTO: 0.8 % (ref 0–2)
BILIRUB SERPL-MCNC: 3.6 MG/DL (ref 0–1)
BUN SERPL-MCNC: 13 MG/DL (ref 9–20)
C DIFF TOX A+B STL IA-ACNC: (no result)
C DIFF TOX A+B STL QL IA: (no result)
CALCIUM SERPL-MCNC: 8.2 MG/DL (ref 8.4–10.2)
CHLORIDE SERPL-SCNC: 104 MMOL/L (ref 98–107)
CO2 SERPL-SCNC: 26 MMOL/L (ref 22–30)
CREAT SERPL-SCNC: 0.74 UMOL/L (ref 0.66–1.25)
EOSINOPHIL # BLD: 0.2 10*3/UL (ref 0–0.7)
EOSINOPHIL NFR BLD: 4 % (ref 0–4)
ERYTHROCYTE [DISTWIDTH] IN BLOOD BY AUTOMATED COUNT: 16.6 % (ref 11.5–14.5)
GLUCOSE SERPL-MCNC: 106 MG/DL (ref 74–106)
GRANULOCYTES # BLD AUTO: 63 % (ref 42.2–75.2)
HCT VFR BLD AUTO: 24.3 % (ref 42–52)
HCT VFR BLD AUTO: 27.1 % (ref 42–52)
HGB BLD-MCNC: 8.3 G/DL (ref 13.5–18)
HGB BLD-MCNC: 9.2 G/DL (ref 13.5–18)
LYMPHOCYTES # BLD: 0.7 10*3/UL (ref 1.2–3.4)
LYMPHOCYTES NFR BLD: 18.1 % (ref 20–51)
MCH RBC QN AUTO: 34 PG (ref 27–31)
MCHC RBC AUTO-ENTMCNC: 34 G/DL (ref 33–37)
MCV RBC AUTO: 100 FL (ref 80–100)
MONOCYTES # BLD: 0.5 10*3/UL (ref 0.1–0.6)
MONOCYTES NFR BLD AUTO: 13 % (ref 1.7–9.3)
NEUTROPHILS # BLD: 2.4 10*3/UL (ref 1.4–6.5)
PLATELET # BLD AUTO: 68 K/MM3 (ref 130–400)
PMV BLD AUTO: 11 FL (ref 7.4–10.4)
POTASSIUM SERPL-SCNC: 3.5 MMOL/L (ref 3.4–5)
PROT SERPL-MCNC: 6.3 GM/DL (ref 6.4–8.2)
RBC # BLD AUTO: 2.44 M/MM3 (ref 4.2–5.6)
SODIUM SERPL-SCNC: 136 MMOL/L (ref 137–145)

## 2021-05-20 NOTE — NUR
Patient lying awake in bed at this time. Patient denies any pain, discomfort,
or further needs at this time. Will continue to monitor. Call light within
reach.

## 2021-05-20 NOTE — NUR
Received call from dining center staff. Patient was requesting Enlive TID. He
is on AHA/carb consistent diet but when offered Glucerna, he states he drinks
regular Ensure at home. May need to change if blood sugars become elevated.

## 2021-05-20 NOTE — NUR
Patient has had an ok day. Peritoneal fluid tested positive for ESBL
Ecoli, antibiotic change to meropenem from [facility]. Puncture site from
yesterdays procedure began to bleed, pressure applied, new dressing placed,
PRAKASH Santiago, notified. Bleeding has since stopped. Patient is currently
resting in bed, wife at the bedside. Does not C/O any pain, discomfort, or
further needs at this time. Patient tested negative for CDIFF. Will continue
to monitor. Call light in reach.

## 2021-05-20 NOTE — NUR
PATIENT UP IN ROOM PER SELF. DENIES ANY NEEDS. REFUSES SCD WHEN ENCOURAGED TO
WEAR SCD WHILE IN BED.

## 2021-05-20 NOTE — NUR
Rested well this shift. Denied nausea/pain/shortness of breath. VS remained
stable. INT to right forearm flushes without difficulty. CT scan for liver
transplant approval completed last night. Denies current needs. Call light in
reach. Will monitor.

## 2021-05-21 VITALS — SYSTOLIC BLOOD PRESSURE: 130 MMHG | DIASTOLIC BLOOD PRESSURE: 76 MMHG | HEART RATE: 71 BPM | TEMPERATURE: 98.1 F

## 2021-05-21 VITALS — HEART RATE: 99 BPM | TEMPERATURE: 98.8 F | DIASTOLIC BLOOD PRESSURE: 74 MMHG | SYSTOLIC BLOOD PRESSURE: 123 MMHG

## 2021-05-21 VITALS — TEMPERATURE: 98.4 F | HEART RATE: 82 BPM | DIASTOLIC BLOOD PRESSURE: 73 MMHG | SYSTOLIC BLOOD PRESSURE: 119 MMHG

## 2021-05-21 VITALS — TEMPERATURE: 98.1 F | SYSTOLIC BLOOD PRESSURE: 119 MMHG | DIASTOLIC BLOOD PRESSURE: 59 MMHG | HEART RATE: 95 BPM

## 2021-05-21 VITALS — SYSTOLIC BLOOD PRESSURE: 128 MMHG | DIASTOLIC BLOOD PRESSURE: 70 MMHG | HEART RATE: 83 BPM | TEMPERATURE: 98.4 F

## 2021-05-21 VITALS — HEART RATE: 91 BPM | SYSTOLIC BLOOD PRESSURE: 115 MMHG | DIASTOLIC BLOOD PRESSURE: 61 MMHG | TEMPERATURE: 97.9 F

## 2021-05-21 VITALS — HEART RATE: 92 BPM | DIASTOLIC BLOOD PRESSURE: 79 MMHG | SYSTOLIC BLOOD PRESSURE: 131 MMHG | TEMPERATURE: 98.4 F

## 2021-05-21 LAB
ALBUMIN SERPL-MCNC: 2.3 GM/DL (ref 3.5–5)
ALP SERPL-CCNC: 99 U/L (ref 50–136)
ALT SERPL-CCNC: 30 U/L (ref 4–49)
ANION GAP SERPL CALC-SCNC: 5 MMOL/L (ref 7–16)
AST SERPL-CCNC: 84 U/L (ref 15–37)
BILIRUB SERPL-MCNC: 3.3 MG/DL (ref 0–1)
BUN SERPL-MCNC: 12 MG/DL (ref 9–20)
CALCIUM SERPL-MCNC: 8 MG/DL (ref 8.4–10.2)
CHLORIDE SERPL-SCNC: 104 MMOL/L (ref 98–107)
CO2 SERPL-SCNC: 26 MMOL/L (ref 22–30)
CREAT SERPL-SCNC: 0.73 UMOL/L (ref 0.66–1.25)
ERYTHROCYTE [DISTWIDTH] IN BLOOD BY AUTOMATED COUNT: 16.6 % (ref 11.5–14.5)
GLUCOSE SERPL-MCNC: 95 MG/DL (ref 74–106)
HCT VFR BLD AUTO: 23.1 % (ref 42–52)
HCT VFR BLD AUTO: 23.1 % (ref 42–52)
HGB BLD-MCNC: 7.8 G/DL (ref 13.5–18)
HGB BLD-MCNC: 7.8 G/DL (ref 13.5–18)
INR BLD: 2.8 (ref 0.8–3)
MCH RBC QN AUTO: 33 PG (ref 27–31)
MCHC RBC AUTO-ENTMCNC: 34 G/DL (ref 33–37)
MCV RBC AUTO: 99 FL (ref 80–100)
PLATELET # BLD AUTO: 63 K/MM3 (ref 130–400)
PMV BLD AUTO: 11 FL (ref 7.4–10.4)
POTASSIUM SERPL-SCNC: 3.6 MMOL/L (ref 3.4–5)
PROT SERPL-MCNC: 6 GM/DL (ref 6.4–8.2)
PROTHROMBIN TIME: 31.5 SECONDS (ref 9.7–12.8)
RBC # BLD AUTO: 2.34 M/MM3 (ref 4.2–5.6)
SODIUM SERPL-SCNC: 135 MMOL/L (ref 137–145)

## 2021-05-21 PROCEDURE — 02HV33Z INSERTION OF INFUSION DEVICE INTO SUPERIOR VENA CAVA, PERCUTANEOUS APPROACH: ICD-10-PCS

## 2021-05-21 NOTE — NUR
Shift assessment completed. Patient A/O x4. Patient denies any pain or
discomfort. Denies SOB, N/V, headache, or dizziness. Paracentesis site covered
with dressing and tape. Dressing C/D/I at this time. Right upper arm PICC site
has small amount of blood oozing from catheter insertion area. Pressure
applied. Applied 4x4 guaze over Tegaderm dressing and covered with ACE wrap.
All scheduled meds given per MAR. Call light within reach. Patient denies any
needs at this time.

## 2021-05-21 NOTE — NUR
Patient lying awake in bed at this time. Patient denies any pain, discomfort,
or further needs at this time. Will continue to monitor. Call light in reach.

## 2021-05-21 NOTE — NUR
Follow-up visit; Patient's wife had shown some concern to a nurse and appeared
somewhat overwhelmed.  looked in on Lisandro and let them both know
 is always available to them to listen and help with body/mind/ and
spiritual matters.  wished them well and offered God's blessings.

## 2021-05-21 NOTE — NUR
JOANN staffed with Harjinder Denis. If a the patient's discharges on
Saturday 5/22 the patient is to [facility] to [facility] at 0900 on
Sunday 5/23 to begin IV antibiotics. JOANN met with the patient and his wife to
provide the above update. They were in agreeance.
 
*Discharge disposition: Home with IV antibiotics from [facility]

## 2021-05-21 NOTE — NUR
Scheduled medications given. Assessments performed. Patient C/O discomfort at
his paracentesis site. Stated that the dressing was pulling on his skin. Upon
redressng the site, it began to bleed. Pressure place, and new dressing
secured. PRAKASH Leon notified, INR ordered. Patient denies any further pain,
discomfort, or needs at this time. VSS. Wife at the bedside. Will continue to
monitor. Call light in reach.

## 2021-05-21 NOTE — NUR
Patient has had an ok day. Abdominal site where paracentesis was preformed has
been bleeding periodically during the day. Gauze, ABD pad, and foam tape used
to dress site. No active bleeding at this time. PICC line place on RUU for
outpatient antibiotic therapy. Albumin being infused. Patient denies any pain,
discomfort, or SOA. Will continue to monitor. Call light in reach.

## 2021-05-21 NOTE — NUR
attended clinical rounds with the team. The patient's wife
present.  The patient will be needing IV antibiotics at discharge. Following
rounds, SW met with the patient and wife to discuss options. They decided on
getting IV antibotics at the [facility]. SW sent referral to [facility]
and informed nursing staff. JOANN staffed with NORBERTO Hendricks regarding the above
information for insurance purposes.
 
*Discharge disposition: Home with OP IV antibiotics at [facility].

## 2021-05-21 NOTE — NUR
Spoke with patient's wife. She became tearful and voiced her frustration and
concern over the decline of her . She states that she is overwhelmed. I
reassured her that we will do all we can to help support her and her 
through this difficult time.  Carmen contacted about visiting patient.
Will continue to monitor.

## 2021-05-22 VITALS — SYSTOLIC BLOOD PRESSURE: 139 MMHG | HEART RATE: 97 BPM | DIASTOLIC BLOOD PRESSURE: 61 MMHG | TEMPERATURE: 98.9 F

## 2021-05-22 VITALS — SYSTOLIC BLOOD PRESSURE: 128 MMHG | TEMPERATURE: 98.1 F | DIASTOLIC BLOOD PRESSURE: 67 MMHG | HEART RATE: 89 BPM

## 2021-05-22 VITALS — HEART RATE: 94 BPM | DIASTOLIC BLOOD PRESSURE: 65 MMHG | SYSTOLIC BLOOD PRESSURE: 130 MMHG

## 2021-05-22 VITALS — HEART RATE: 99 BPM | SYSTOLIC BLOOD PRESSURE: 132 MMHG | DIASTOLIC BLOOD PRESSURE: 79 MMHG

## 2021-05-22 VITALS — SYSTOLIC BLOOD PRESSURE: 138 MMHG | HEART RATE: 91 BPM | DIASTOLIC BLOOD PRESSURE: 74 MMHG | TEMPERATURE: 98 F

## 2021-05-22 VITALS — HEART RATE: 89 BPM | DIASTOLIC BLOOD PRESSURE: 76 MMHG | SYSTOLIC BLOOD PRESSURE: 134 MMHG | TEMPERATURE: 98.1 F

## 2021-05-22 VITALS — HEART RATE: 86 BPM | SYSTOLIC BLOOD PRESSURE: 128 MMHG | DIASTOLIC BLOOD PRESSURE: 72 MMHG | TEMPERATURE: 97.8 F

## 2021-05-22 VITALS — HEART RATE: 95 BPM | DIASTOLIC BLOOD PRESSURE: 66 MMHG | SYSTOLIC BLOOD PRESSURE: 122 MMHG

## 2021-05-22 VITALS — HEART RATE: 94 BPM | DIASTOLIC BLOOD PRESSURE: 83 MMHG | TEMPERATURE: 98.1 F | SYSTOLIC BLOOD PRESSURE: 153 MMHG

## 2021-05-22 VITALS — DIASTOLIC BLOOD PRESSURE: 73 MMHG | SYSTOLIC BLOOD PRESSURE: 145 MMHG | HEART RATE: 90 BPM | TEMPERATURE: 98 F

## 2021-05-22 VITALS — SYSTOLIC BLOOD PRESSURE: 128 MMHG | HEART RATE: 93 BPM | TEMPERATURE: 98.4 F | DIASTOLIC BLOOD PRESSURE: 51 MMHG

## 2021-05-22 VITALS — HEART RATE: 100 BPM | DIASTOLIC BLOOD PRESSURE: 66 MMHG | TEMPERATURE: 98.9 F | SYSTOLIC BLOOD PRESSURE: 140 MMHG

## 2021-05-22 VITALS — TEMPERATURE: 98.1 F | SYSTOLIC BLOOD PRESSURE: 130 MMHG | HEART RATE: 86 BPM | DIASTOLIC BLOOD PRESSURE: 76 MMHG

## 2021-05-22 VITALS — SYSTOLIC BLOOD PRESSURE: 128 MMHG | TEMPERATURE: 98.3 F | DIASTOLIC BLOOD PRESSURE: 71 MMHG | HEART RATE: 94 BPM

## 2021-05-22 VITALS — DIASTOLIC BLOOD PRESSURE: 71 MMHG | SYSTOLIC BLOOD PRESSURE: 123 MMHG | HEART RATE: 94 BPM | TEMPERATURE: 98.9 F

## 2021-05-22 LAB
ANION GAP SERPL CALC-SCNC: 8 MMOL/L (ref 7–16)
BASOPHILS # BLD: 0 10*3/UL (ref 0–0.2)
BASOPHILS NFR BLD AUTO: 0.3 % (ref 0–2)
BUN SERPL-MCNC: 11 MG/DL (ref 9–20)
CALCIUM SERPL-MCNC: 8.2 MG/DL (ref 8.4–10.2)
CHLORIDE SERPL-SCNC: 103 MMOL/L (ref 98–107)
CO2 SERPL-SCNC: 24 MMOL/L (ref 22–30)
CREAT SERPL-SCNC: 0.69 UMOL/L (ref 0.66–1.25)
EOSINOPHIL # BLD: 0.1 10*3/UL (ref 0–0.7)
EOSINOPHIL NFR BLD: 4.2 % (ref 0–4)
ERYTHROCYTE [DISTWIDTH] IN BLOOD BY AUTOMATED COUNT: 16.5 % (ref 11.5–14.5)
GLUCOSE SERPL-MCNC: 114 MG/DL (ref 74–106)
GRANULOCYTES # BLD AUTO: 62.9 % (ref 42.2–75.2)
HCT VFR BLD AUTO: 20.4 % (ref 42–52)
HCT VFR BLD AUTO: 23.5 % (ref 42–52)
HGB BLD-MCNC: 6.9 G/DL (ref 13.5–18)
HGB BLD-MCNC: 7.9 G/DL (ref 13.5–18)
INR BLD: 3.8 (ref 0.8–3)
LYMPHOCYTES # BLD: 0.6 10*3/UL (ref 1.2–3.4)
LYMPHOCYTES NFR BLD: 18.6 % (ref 20–51)
MCH RBC QN AUTO: 33 PG (ref 27–31)
MCHC RBC AUTO-ENTMCNC: 34 G/DL (ref 33–37)
MCV RBC AUTO: 99 FL (ref 80–100)
MONOCYTES # BLD: 0.4 10*3/UL (ref 0.1–0.6)
MONOCYTES NFR BLD AUTO: 13.7 % (ref 1.7–9.3)
NEUTROPHILS # BLD: 1.9 10*3/UL (ref 1.4–6.5)
PLATELET # BLD AUTO: 59 K/MM3 (ref 130–400)
PMV BLD AUTO: 10.8 FL (ref 7.4–10.4)
POTASSIUM SERPL-SCNC: 3.6 MMOL/L (ref 3.4–5)
PROTHROMBIN TIME: 44 SECONDS (ref 9.7–12.8)
RBC # BLD AUTO: 2.07 M/MM3 (ref 4.2–5.6)
SODIUM SERPL-SCNC: 136 MMOL/L (ref 137–145)

## 2021-05-22 NOTE — NUR
REPORT CALLED TO RN AT . REPORT GIVEN TO EMS, EMS ARRIVED, PLATELET INFUSION
FINISHED, PICC FLUSHED. PT STANDBY TO EMS CART, PT LEAVING VIA EMS, WIFE WILL
DRIVE UP TO  TOMORROW.

## 2021-05-22 NOTE — NUR
Blood transfusion started at 04:20 am at 60ml/hr. Blood product verified with
2 RNs. VS stable. Patient alert and oriented. No adverse reaction to
transfusion noted at this time. Will continue to monitor.

## 2021-05-22 NOTE — NUR
DURING SHIFT REPORT PT REPORTED 6/10 ABD PAIN FROM PARACENTESIS SITE. DRESSING
CDI. KRISHNA RANDALL NOTIFIED OF BLEEDING AT PICC SITE AND PAIN IN ABD. UPON
REENTERING ROOM FOR MORNING MEDS PT HAD BLOOD ALL OVER SHEETS AND RON (PRESENT
IN AM AS WELL).  R ABD PARACENTESIS SITE COVERED IN DRESSING, DRESSING NOT
SATURATED. PT BLEEDING FROM RON PICC SITE. GAUZE ON IT AND ACE WRAP COVERING,
BLOOD SATURATING AREA.  PT NOTED RFA COBAN AND GAUZE WAS UNCOMORTABLE BECAUSE
IT WAS SO TIGHT. TOOK OFF COBAN TO ASSESS SITE TO SEE IF GAUZE WAS STILL
NECESSARY, PURPLE BRUISE PRESENT AND PT STARTED BLEEDING FROM SKIN. MORE GAUZE
AND COBAN APPLIED TO STOP BLEEDING. SHEETS CHANGED AND CLEAN GOWN BROUGHT IN
FOR PT. VITAMIN K GIVEN IV. STAYED AT PT SIDE FOR FULL INFUSION, AFTER THAT
FFP WAS VERIFIED AND ADMINISTRATION STARTED, ASSESSMENT PERFORMED, MEDICATIONS
GIVEN, PT REFUSED LACTULOSE, PT EDUCATED ON S/S OF A TRANSFUSION REACTION.
STAYED WITH PT FOR FIRST 15 MINUTES OF INFUSION. NO S/S OF COMPLICATIONS AT
THIS TIME. FAMILY INQUIRING ABOUT LIQUID STITCHES, CONCERN PASSED ALONG TO
PHYSICIAN.

## 2021-05-22 NOTE — NUR
CYOPRECIPITATE INFUSED. FFP OBTAINED FROM LAB, VERFIED WITH QUENTIN SWEENEY. VITALS
OBTAINED, INFUSION STARTED, EDUCATED PT ON S/S OF INFUSION REACTION. STAYED
NEXT TO PT FOR FIRST FIFTEEN MINUTES. SOME BLEEDING ON LIP NOTICED.

## 2021-05-22 NOTE — NUR
Patient has oozing blood from paracentesis site. Dressing moderately soaked
with blood. Applied pressure manually and changed dressing to paracentesis
site. Right forearm skin bruising site started bleeding.  Covered with dry
gauze and wrapped with Ace-wrap. Contacted hospitalist Es, and
Es come to the room and assessed patient. VS stable. Will continue to
monitor.

## 2022-05-19 NOTE — NUR
Report received from QUENTIN Higginbotham. Pt in bed resting, denies needs, will continue
to monitor. Home